# Patient Record
Sex: FEMALE | Race: WHITE | NOT HISPANIC OR LATINO | Employment: FULL TIME | URBAN - METROPOLITAN AREA
[De-identification: names, ages, dates, MRNs, and addresses within clinical notes are randomized per-mention and may not be internally consistent; named-entity substitution may affect disease eponyms.]

---

## 2023-03-09 ENCOUNTER — TELEPHONE (OUTPATIENT)
Dept: PRIMARY CARE | Facility: CLINIC | Age: 43
End: 2023-03-09
Payer: COMMERCIAL

## 2023-03-09 NOTE — TELEPHONE ENCOUNTER
She was seen at  on Sunday and dx with strep. Symptoms started Sat. She is on  mg bid for 10 days. She states she now has laryngitis, and her throat is very swollen. She wants to know if she should come in to be seen or if the Abx takes a while to work?

## 2023-03-10 NOTE — TELEPHONE ENCOUNTER
Spoke with pt. She is feeling worse but does not want to see anyone else but KT. She will call in the morning to be added as a same day sick appt. To see KT.

## 2023-03-11 ENCOUNTER — OFFICE VISIT (OUTPATIENT)
Dept: PRIMARY CARE | Facility: CLINIC | Age: 43
End: 2023-03-11
Payer: COMMERCIAL

## 2023-03-11 VITALS
BODY MASS INDEX: 28.32 KG/M2 | HEART RATE: 72 BPM | OXYGEN SATURATION: 98 % | SYSTOLIC BLOOD PRESSURE: 124 MMHG | WEIGHT: 191.2 LBS | HEIGHT: 69 IN | DIASTOLIC BLOOD PRESSURE: 76 MMHG

## 2023-03-11 DIAGNOSIS — B37.9 ANTIBIOTIC-INDUCED YEAST INFECTION: Primary | ICD-10-CM

## 2023-03-11 DIAGNOSIS — T36.95XA ANTIBIOTIC-INDUCED YEAST INFECTION: Primary | ICD-10-CM

## 2023-03-11 DIAGNOSIS — J40 BRONCHITIS: ICD-10-CM

## 2023-03-11 PROCEDURE — 1036F TOBACCO NON-USER: CPT | Performed by: NURSE PRACTITIONER

## 2023-03-11 PROCEDURE — 99213 OFFICE O/P EST LOW 20 MIN: CPT | Performed by: NURSE PRACTITIONER

## 2023-03-11 RX ORDER — BUPROPION HYDROCHLORIDE 300 MG/1
1 TABLET ORAL DAILY
COMMUNITY
Start: 2022-08-26 | End: 2023-05-15

## 2023-03-11 RX ORDER — ACETAMINOPHEN 325 MG/1
500 TABLET ORAL EVERY 6 HOURS
COMMUNITY
Start: 2020-07-28

## 2023-03-11 RX ORDER — IBUPROFEN 400 MG/1
200 TABLET ORAL EVERY 6 HOURS PRN
COMMUNITY
Start: 2016-12-05 | End: 2023-05-13 | Stop reason: SDUPTHER

## 2023-03-11 RX ORDER — SUMATRIPTAN SUCCINATE 25 MG/1
25 TABLET ORAL ONCE AS NEEDED
COMMUNITY
Start: 2023-02-14 | End: 2023-05-13 | Stop reason: SDUPTHER

## 2023-03-11 RX ORDER — LEVONORGESTREL 52 MG/1
1 INTRAUTERINE DEVICE INTRAUTERINE ONCE
COMMUNITY
Start: 2016-12-05 | End: 2023-05-13 | Stop reason: SDUPTHER

## 2023-03-11 RX ORDER — ALBUTEROL SULFATE 90 UG/1
2 AEROSOL, METERED RESPIRATORY (INHALATION) EVERY 4 HOURS PRN
COMMUNITY
Start: 2022-09-14

## 2023-03-11 RX ORDER — FLUCONAZOLE 150 MG/1
TABLET ORAL
Qty: 2 TABLET | Refills: 0 | Status: SHIPPED | OUTPATIENT
Start: 2023-03-11 | End: 2023-05-13 | Stop reason: SDUPTHER

## 2023-03-11 RX ORDER — EPINEPHRINE 0.3 MG/.3ML
0.3 INJECTION SUBCUTANEOUS AS NEEDED
COMMUNITY
Start: 2017-06-02 | End: 2023-03-22 | Stop reason: SDUPTHER

## 2023-03-11 RX ORDER — DOXYCYCLINE 100 MG/1
100 CAPSULE ORAL 2 TIMES DAILY
Qty: 20 CAPSULE | Refills: 0 | Status: SHIPPED | OUTPATIENT
Start: 2023-03-11 | End: 2023-03-21

## 2023-03-11 RX ORDER — OMEPRAZOLE 40 MG/1
40 CAPSULE, DELAYED RELEASE ORAL
COMMUNITY
End: 2023-05-13 | Stop reason: SDUPTHER

## 2023-03-11 RX ORDER — MINERAL OIL
1 ENEMA (ML) RECTAL DAILY
COMMUNITY

## 2023-03-11 RX ORDER — PENICILLIN V POTASSIUM 500 MG/1
1 TABLET, FILM COATED ORAL 2 TIMES DAILY
COMMUNITY
Start: 2023-03-06 | End: 2023-05-13 | Stop reason: SDUPTHER

## 2023-03-11 RX ORDER — AZELASTINE 1 MG/ML
2 SPRAY, METERED NASAL 2 TIMES DAILY
COMMUNITY
Start: 2020-07-07

## 2023-03-11 RX ORDER — BUTYROSPERMUM PARKII(SHEA BUTTER), SIMMONDSIA CHINENSIS (JOJOBA) SEED OIL, ALOE BARBADENSIS LEAF EXTRACT .01; 1; 3.5 G/100G; G/100G; G/100G
1 LIQUID TOPICAL DAILY
COMMUNITY
Start: 2016-12-05

## 2023-03-11 RX ORDER — FLAXSEED OIL 1000 MG
1 CAPSULE ORAL DAILY
COMMUNITY

## 2023-03-11 RX ORDER — HYDROXYCHLOROQUINE SULFATE 200 MG/1
1 TABLET, FILM COATED ORAL 2 TIMES DAILY
COMMUNITY
Start: 2022-09-14 | End: 2023-05-13 | Stop reason: SDUPTHER

## 2023-03-11 RX ORDER — FLUTICASONE PROPIONATE 50 MCG
2 SPRAY, SUSPENSION (ML) NASAL DAILY
COMMUNITY
Start: 2021-04-15 | End: 2023-05-13 | Stop reason: SDUPTHER

## 2023-03-11 ASSESSMENT — ENCOUNTER SYMPTOMS
COUGH: 1
GASTROINTESTINAL NEGATIVE: 1
CARDIOVASCULAR NEGATIVE: 1
SINUS PRESSURE: 1
WHEEZING: 1
SORE THROAT: 1

## 2023-03-11 NOTE — PROGRESS NOTES
"Subjective   Patient ID: Jose Wu is a 42 y.o. female who presents for Sore Throat, Cough, Joint Swelling, and chest congestion ([facility]).    Patient presents with continued cough congestion postnasal drainage.  [facility] [place] [facility] was treated with penicillin.  Patient does feel her sore throat has improved.  Patient states she is now having more chest congestion cough.  Denies any fevers or chills.  Patient also reports she does have a tendency to get yeast infections after antibiotic use especially multiple antibiotics    Sore Throat   Associated symptoms include congestion and coughing.   Cough  Associated symptoms include postnasal drip, a sore throat and wheezing.        Review of Systems   HENT:  Positive for congestion, postnasal drip, sinus pressure and sore throat.    Respiratory:  Positive for cough and wheezing.    Cardiovascular: Negative.    Gastrointestinal: Negative.    Genitourinary: Negative.    Skin: Negative.        Objective   /76   Pulse 72   Ht 1.74 m (5' 8.5\")   Wt 86.7 kg (191 lb 3.2 oz)   SpO2 98%   BMI 28.65 kg/m²     Physical Exam    Assessment/Plan   Problem List Items Addressed This Visit          Respiratory    Bronchitis     Patient continues to have cough congestion for over a week.  Patient was seen at urgent care for strep but states the symptoms started due to after.  She has been on the penicillin will complete as the sore throat has improved    Discussed this could be viral but we will send in doxycycline due to patient's history she should start if not improving after finishing the penicillin or if symptoms worsen    Patient to continue using her sinus/allergy medication         Relevant Medications    doxycycline (Vibramycin) 100 mg capsule       Infectious/Inflammatory    Antibiotic-induced yeast infection - Primary     Due to multiple antibiotic use we will send in Diflucan as patient has a " history of developing yeast infections         Relevant Medications    fluconazole (Diflucan) 150 mg tablet

## 2023-03-11 NOTE — ASSESSMENT & PLAN NOTE
Patient continues to have cough congestion for over a week.  Patient was seen at urgent care for strep but states the symptoms started due to after.  She has been on the penicillin will complete as the sore throat has improved    Discussed this could be viral but we will send in doxycycline due to patient's history she should start if not improving after finishing the penicillin or if symptoms worsen    Patient to continue using her sinus/allergy medication

## 2023-03-11 NOTE — PATIENT INSTRUCTIONS
As we discussed I sent in the doxycycline.  I would suggest you finish the penicillin if symptoms improve do not start the doxycycline.  I feel at this time you may have a viral bronchitis on top of the strep you had almost a week ago.  Please continue to stay hydrated and get rest.    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

## 2023-03-11 NOTE — ASSESSMENT & PLAN NOTE
Due to multiple antibiotic use we will send in Diflucan as patient has a history of developing yeast infections

## 2023-03-21 ENCOUNTER — TELEPHONE (OUTPATIENT)
Dept: PRIMARY CARE | Facility: CLINIC | Age: 43
End: 2023-03-21
Payer: COMMERCIAL

## 2023-03-21 DIAGNOSIS — Z88.1 DRUG ALLERGY, ANTIBIOTIC: ICD-10-CM

## 2023-03-21 NOTE — TELEPHONE ENCOUNTER
Rx Refill Request Telephone Encounter    Name:  Jose Wu  :  [id number]  Medication Name:   epipen    prn for seafood allergy      1 box of 2    Specific Pharm[place]  [facility] [place]  Date of last appointment:  11/15/2022  Date of next appointment:  [id number]  Best number to reach patient:  [phone]

## 2023-03-22 RX ORDER — EPINEPHRINE 0.3 MG/.3ML
0.3 INJECTION SUBCUTANEOUS AS NEEDED
Qty: 2 EACH | Refills: 2 | Status: SHIPPED | OUTPATIENT
Start: 2023-03-22 | End: 2023-03-24 | Stop reason: SDUPTHER

## 2023-03-25 DIAGNOSIS — J40 BRONCHITIS: ICD-10-CM

## 2023-03-25 DIAGNOSIS — Z88.1 DRUG ALLERGY, ANTIBIOTIC: ICD-10-CM

## 2023-03-25 RX ORDER — EPINEPHRINE 0.3 MG/.3ML
0.3 INJECTION SUBCUTANEOUS AS NEEDED
Qty: 2 EACH | Refills: 2 | Status: SHIPPED | OUTPATIENT
Start: 2023-03-25 | End: 2023-03-27

## 2023-05-12 PROBLEM — R07.89 CHEST PAIN, ATYPICAL: Status: ACTIVE | Noted: 2023-05-12

## 2023-05-12 PROBLEM — J02.8 ACUTE BACTERIAL PHARYNGITIS: Status: RESOLVED | Noted: 2023-05-12 | Resolved: 2023-05-12

## 2023-05-12 PROBLEM — K81.1 CHOLECYSTITIS, CHRONIC: Status: ACTIVE | Noted: 2023-05-12

## 2023-05-12 PROBLEM — U07.1 COVID-19: Status: RESOLVED | Noted: 2023-05-12 | Resolved: 2023-05-12

## 2023-05-12 PROBLEM — J02.9 SORE THROAT: Status: RESOLVED | Noted: 2023-05-12 | Resolved: 2023-05-12

## 2023-05-12 PROBLEM — R76.8 ANA POSITIVE: Status: ACTIVE | Noted: 2023-05-12

## 2023-05-12 PROBLEM — K21.9 GERD (GASTROESOPHAGEAL REFLUX DISEASE): Status: ACTIVE | Noted: 2023-05-12

## 2023-05-12 PROBLEM — N63.10 BREAST MASS, RIGHT: Status: ACTIVE | Noted: 2023-05-12

## 2023-05-12 PROBLEM — F32.A CHRONIC DEPRESSION: Status: ACTIVE | Noted: 2023-05-12

## 2023-05-12 PROBLEM — J01.01 ACUTE RECURRENT MAXILLARY SINUSITIS: Status: RESOLVED | Noted: 2023-05-12 | Resolved: 2023-05-12

## 2023-05-12 PROBLEM — M77.50 TENDONITIS OF FOOT: Status: RESOLVED | Noted: 2023-05-12 | Resolved: 2023-05-12

## 2023-05-12 PROBLEM — R10.13 INTERMITTENT EPIGASTRIC ABDOMINAL PAIN: Status: ACTIVE | Noted: 2023-05-12

## 2023-05-12 PROBLEM — M79.642 PAIN IN BOTH HANDS: Status: ACTIVE | Noted: 2023-05-12

## 2023-05-12 PROBLEM — R05.9 COUGH: Status: RESOLVED | Noted: 2023-05-12 | Resolved: 2023-05-12

## 2023-05-12 PROBLEM — B37.31 YEAST VAGINITIS: Status: RESOLVED | Noted: 2023-05-12 | Resolved: 2023-05-12

## 2023-05-12 PROBLEM — M79.641 PAIN IN BOTH HANDS: Status: ACTIVE | Noted: 2023-05-12

## 2023-05-12 PROBLEM — K81.0 CHOLECYSTITIS, ACUTE: Status: RESOLVED | Noted: 2023-05-12 | Resolved: 2023-05-12

## 2023-05-12 PROBLEM — J20.9 BRONCHITIS WITH BRONCHOSPASM: Status: RESOLVED | Noted: 2023-05-12 | Resolved: 2023-05-12

## 2023-05-12 PROBLEM — G43.009 MIGRAINE WITHOUT AURA AND WITHOUT STATUS MIGRAINOSUS, NOT INTRACTABLE: Status: ACTIVE | Noted: 2023-05-12

## 2023-05-12 PROBLEM — N39.0 UTI (URINARY TRACT INFECTION): Status: RESOLVED | Noted: 2023-05-12 | Resolved: 2023-05-12

## 2023-05-12 PROBLEM — J30.9 ALLERGIC RHINITIS: Status: ACTIVE | Noted: 2023-05-12

## 2023-05-12 PROBLEM — J01.40 ACUTE NON-RECURRENT PANSINUSITIS: Status: RESOLVED | Noted: 2023-05-12 | Resolved: 2023-05-12

## 2023-05-12 PROBLEM — H10.31 ACUTE BACTERIAL CONJUNCTIVITIS OF RIGHT EYE: Status: RESOLVED | Noted: 2023-05-12 | Resolved: 2023-05-12

## 2023-05-12 PROBLEM — M19.90 INFLAMMATORY ARTHRITIS: Status: ACTIVE | Noted: 2023-05-12

## 2023-05-12 PROBLEM — B96.89 ACUTE BACTERIAL PHARYNGITIS: Status: RESOLVED | Noted: 2023-05-12 | Resolved: 2023-05-12

## 2023-05-12 PROBLEM — R53.83 FATIGUE: Status: ACTIVE | Noted: 2023-05-12

## 2023-05-12 PROBLEM — R10.813 RIGHT LOWER QUADRANT ABDOMINAL TENDERNESS WITHOUT REBOUND TENDERNESS: Status: ACTIVE | Noted: 2023-05-12

## 2023-05-13 ENCOUNTER — LAB (OUTPATIENT)
Dept: LAB | Facility: LAB | Age: 43
End: 2023-05-13
Payer: COMMERCIAL

## 2023-05-13 ENCOUNTER — OFFICE VISIT (OUTPATIENT)
Dept: PRIMARY CARE | Facility: CLINIC | Age: 43
End: 2023-05-13
Payer: COMMERCIAL

## 2023-05-13 VITALS
BODY MASS INDEX: 28.14 KG/M2 | DIASTOLIC BLOOD PRESSURE: 84 MMHG | OXYGEN SATURATION: 96 % | HEART RATE: 90 BPM | TEMPERATURE: 98.1 F | SYSTOLIC BLOOD PRESSURE: 138 MMHG | WEIGHT: 190 LBS | HEIGHT: 69 IN

## 2023-05-13 DIAGNOSIS — R76.8 ANA POSITIVE: ICD-10-CM

## 2023-05-13 DIAGNOSIS — R53.83 FATIGUE, UNSPECIFIED TYPE: ICD-10-CM

## 2023-05-13 DIAGNOSIS — F32.A CHRONIC DEPRESSION: ICD-10-CM

## 2023-05-13 DIAGNOSIS — G43.009 MIGRAINE WITHOUT AURA AND WITHOUT STATUS MIGRAINOSUS, NOT INTRACTABLE: ICD-10-CM

## 2023-05-13 DIAGNOSIS — B99.9 RECURRENT INFECTIONS: ICD-10-CM

## 2023-05-13 DIAGNOSIS — M79.641 PAIN IN BOTH HANDS: ICD-10-CM

## 2023-05-13 DIAGNOSIS — J03.01 RECURRENT STREPTOCOCCAL TONSILLITIS: Primary | ICD-10-CM

## 2023-05-13 DIAGNOSIS — J03.01 RECURRENT STREPTOCOCCAL TONSILLITIS: ICD-10-CM

## 2023-05-13 DIAGNOSIS — Z88.9 DRUG ALLERGY: ICD-10-CM

## 2023-05-13 DIAGNOSIS — M79.642 PAIN IN BOTH HANDS: ICD-10-CM

## 2023-05-13 LAB
ERYTHROCYTE DISTRIBUTION WIDTH (RATIO) BY AUTOMATED COUNT: 13.1 % (ref 11.5–14.5)
ERYTHROCYTE MEAN CORPUSCULAR HEMOGLOBIN CONCENTRATION (G/DL) BY AUTOMATED: 30.9 G/DL (ref 32–36)
ERYTHROCYTE MEAN CORPUSCULAR VOLUME (FL) BY AUTOMATED COUNT: 87 FL (ref 80–100)
ERYTHROCYTES (10*6/UL) IN BLOOD BY AUTOMATED COUNT: 5.15 X10E12/L (ref 4–5.2)
HEMATOCRIT (%) IN BLOOD BY AUTOMATED COUNT: 44.7 % (ref 36–46)
HEMOGLOBIN (G/DL) IN BLOOD: 13.8 G/DL (ref 12–16)
LEUKOCYTES (10*3/UL) IN BLOOD BY AUTOMATED COUNT: 11.1 X10E9/L (ref 4.4–11.3)
PLATELETS (10*3/UL) IN BLOOD AUTOMATED COUNT: 307 X10E9/L (ref 150–450)
SEDIMENTATION RATE, ERYTHROCYTE: 30 MM/H (ref 0–20)
THYROTROPIN (MIU/L) IN SER/PLAS BY DETECTION LIMIT <= 0.05 MIU/L: 1.42 MIU/L (ref 0.44–3.98)

## 2023-05-13 PROCEDURE — 85652 RBC SED RATE AUTOMATED: CPT

## 2023-05-13 PROCEDURE — 82784 ASSAY IGA/IGD/IGG/IGM EACH: CPT

## 2023-05-13 PROCEDURE — 99214 OFFICE O/P EST MOD 30 MIN: CPT | Performed by: FAMILY MEDICINE

## 2023-05-13 PROCEDURE — 85027 COMPLETE CBC AUTOMATED: CPT

## 2023-05-13 PROCEDURE — 84443 ASSAY THYROID STIM HORMONE: CPT

## 2023-05-13 PROCEDURE — 1036F TOBACCO NON-USER: CPT | Performed by: FAMILY MEDICINE

## 2023-05-13 PROCEDURE — 36415 COLL VENOUS BLD VENIPUNCTURE: CPT

## 2023-05-13 RX ORDER — OMEPRAZOLE 40 MG/1
1 CAPSULE, DELAYED RELEASE ORAL DAILY
COMMUNITY
Start: 2022-11-21 | End: 2023-06-01

## 2023-05-13 RX ORDER — PENICILLIN V POTASSIUM 500 MG/1
500 TABLET, FILM COATED ORAL 2 TIMES DAILY
Qty: 20 TABLET | Refills: 0 | COMMUNITY
Start: 2023-05-11 | End: 2023-05-21

## 2023-05-13 RX ORDER — SUMATRIPTAN SUCCINATE 25 MG/1
25 TABLET ORAL ONCE AS NEEDED
Qty: 9 TABLET | Refills: 3 | Status: SHIPPED | OUTPATIENT
Start: 2023-05-13 | End: 2023-09-19

## 2023-05-13 RX ORDER — FLUTICASONE PROPIONATE 50 MCG
1 SPRAY, SUSPENSION (ML) NASAL
COMMUNITY

## 2023-05-13 RX ORDER — AMOXICILLIN AND CLAVULANATE POTASSIUM 875; 125 MG/1; MG/1
875 TABLET, FILM COATED ORAL 2 TIMES DAILY
Qty: 28 TABLET | Refills: 0 | Status: SHIPPED | OUTPATIENT
Start: 2023-05-13 | End: 2023-05-27

## 2023-05-13 RX ORDER — LEVONORGESTREL 52 MG/1
52 INTRAUTERINE DEVICE INTRAUTERINE
COMMUNITY
Start: 2016-12-05

## 2023-05-13 RX ORDER — IBUPROFEN 600 MG/1
600 TABLET ORAL EVERY 6 HOURS PRN
COMMUNITY
Start: 2012-11-13

## 2023-05-13 RX ORDER — FLUCONAZOLE 150 MG/1
150 TABLET ORAL ONCE
Qty: 1 TABLET | Refills: 1 | Status: SHIPPED | OUTPATIENT
Start: 2023-05-13 | End: 2023-05-13

## 2023-05-13 RX ORDER — HYDROXYCHLOROQUINE SULFATE 200 MG/1
200 TABLET, FILM COATED ORAL
COMMUNITY
End: 2024-01-03

## 2023-05-13 ASSESSMENT — ENCOUNTER SYMPTOMS
FEVER: 1
COUGH: 1

## 2023-05-13 NOTE — PATIENT INSTRUCTIONS
Patient does have a family history of immune deficiency as well as multiple allergies, think it would be helpful to have her see the immunologist and allergist again to reassess her medication allergies and see if she has an immune deficiency    You may take over-the-counter medications as needed for symptom relief.  Call the office if your symptoms worsen such as high fever and worsening cough or increase in symptoms.     Get your blood work as ordered.  You should hear from our office with results whether they are normal are not within a few days.  Please call the office if you do not hear from us.     Diflucan as needed    Refills given on migraine medication    Recommend staying off Plaquenil for a week to see if that helps

## 2023-05-13 NOTE — PROGRESS NOTES
"Subjective   Patient ID: Jose Wu is a 43 y.o. female who presents for URI (Pt has had strep 3x since march, causes laryngitis, Pt seen at urgent care Thursday.  ).    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   Respiratory:  Positive for cough.        Objective   /84   Pulse 90   Temp 36.7 °C (98.1 °F)   Ht 1.74 m (5' 8.5\")   Wt 86.2 kg (190 lb)   SpO2 96%   BMI 28.47 kg/m²     Physical Exam  Constitutional:       Appearance: Normal appearance.   HENT:      Head: Normocephalic and atraumatic.      Right Ear: Tympanic membrane and ear canal normal.      Left Ear: Tympanic membrane and ear canal normal.      Nose: No nasal deformity.      Right Sinus: No maxillary sinus tenderness or frontal sinus tenderness.      Left Sinus: No maxillary sinus tenderness or frontal sinus tenderness.      Mouth/Throat:      Mouth: Mucous membranes are moist. No oral lesions.      Tongue: No lesions.      Pharynx: Oropharynx is clear.      Comments: Small tonsillar tissue with erythema bilaterally  Cardiovascular:      Rate and Rhythm: Normal rate and regular rhythm.   Pulmonary:      Effort: Pulmonary effort is normal.      Breath sounds: Normal breath sounds.   Musculoskeletal:      Cervical back: No tenderness.   Lymphadenopathy:      Cervical: No cervical adenopathy.   Neurological:      Mental Status: She is alert.     No axillary, inguinal, supraclavicular or cervical adenopathy    Assessment/Plan   Problem List Items Addressed This Visit    None         "

## 2023-05-14 LAB
IGA (MG/DL) IN SER/PLAS: 112 MG/DL (ref 70–400)
IGG (MG/DL) IN SER/PLAS: 988 MG/DL (ref 700–1600)
IGM (MG/DL) IN SER/PLAS: 59 MG/DL (ref 40–230)

## 2023-05-15 RX ORDER — HYDROXYCHLOROQUINE SULFATE 200 MG/1
TABLET, FILM COATED ORAL
Qty: 180 TABLET | Refills: 0 | OUTPATIENT
Start: 2023-05-15

## 2023-05-15 RX ORDER — BUPROPION HYDROCHLORIDE 300 MG/1
300 TABLET ORAL DAILY
Qty: 90 TABLET | Refills: 2 | Status: SHIPPED | OUTPATIENT
Start: 2023-05-15 | End: 2024-02-19 | Stop reason: SDUPTHER

## 2023-05-15 NOTE — RESULT ENCOUNTER NOTE
Please notify pt of lab results Labs show elevated sedimentation rate which is an inflammatory marker likely related to her arthritis, CBC is normal, immunoglobulins normal, thyroid normal, recommend see immunologist as discussed

## 2023-05-16 ENCOUNTER — APPOINTMENT (OUTPATIENT)
Dept: PRIMARY CARE | Facility: CLINIC | Age: 43
End: 2023-05-16
Payer: COMMERCIAL

## 2023-05-31 DIAGNOSIS — K21.9 GASTROESOPHAGEAL REFLUX DISEASE, UNSPECIFIED WHETHER ESOPHAGITIS PRESENT: ICD-10-CM

## 2023-06-01 RX ORDER — OMEPRAZOLE 40 MG/1
CAPSULE, DELAYED RELEASE ORAL
Qty: 90 CAPSULE | Refills: 0 | Status: SHIPPED | OUTPATIENT
Start: 2023-06-01 | End: 2023-09-11

## 2023-06-21 ENCOUNTER — APPOINTMENT (OUTPATIENT)
Dept: LAB | Facility: LAB | Age: 43
End: 2023-06-21
Payer: COMMERCIAL

## 2023-06-21 LAB
ALLERGEN FOOD: PECAN NUT (CARYA ILLINOENSIS) IGE (KU/L): NORMAL
ALLERGEN MICROORGANISM: PENICILLIUM CHRYSOGENUM (P. NOTATUM) IGE (KU/L): NORMAL
ALLERGEN TREE: MAPLE LEAF SYCAMORE, LONDON PLANE IGE (KU/L): NORMAL
BASOPHILS (10*3/UL) IN BLOOD BY AUTOMATED COUNT: 0.04 X10E9/L (ref 0–0.1)
BASOPHILS/100 LEUKOCYTES IN BLOOD BY AUTOMATED COUNT: 0.6 % (ref 0–2)
C REACTIVE PROTEIN (MG/L) IN SER/PLAS: 0.16 MG/DL
EOSINOPHILS (10*3/UL) IN BLOOD BY AUTOMATED COUNT: 0.15 X10E9/L (ref 0–0.7)
EOSINOPHILS/100 LEUKOCYTES IN BLOOD BY AUTOMATED COUNT: 2.3 % (ref 0–6)
ERYTHROCYTE DISTRIBUTION WIDTH (RATIO) BY AUTOMATED COUNT: 13 % (ref 11.5–14.5)
ERYTHROCYTE MEAN CORPUSCULAR HEMOGLOBIN CONCENTRATION (G/DL) BY AUTOMATED: 31.7 G/DL (ref 32–36)
ERYTHROCYTE MEAN CORPUSCULAR VOLUME (FL) BY AUTOMATED COUNT: 85 FL (ref 80–100)
ERYTHROCYTES (10*6/UL) IN BLOOD BY AUTOMATED COUNT: 4.8 X10E12/L (ref 4–5.2)
HEMATOCRIT (%) IN BLOOD BY AUTOMATED COUNT: 40.7 % (ref 36–46)
HEMOGLOBIN (G/DL) IN BLOOD: 12.9 G/DL (ref 12–16)
IMMATURE GRANULOCYTES/100 LEUKOCYTES IN BLOOD BY AUTOMATED COUNT: 0.6 % (ref 0–0.9)
LEUKOCYTES (10*3/UL) IN BLOOD BY AUTOMATED COUNT: 6.6 X10E9/L (ref 4.4–11.3)
LYMPHOCYTES (10*3/UL) IN BLOOD BY AUTOMATED COUNT: 1.11 X10E9/L (ref 1.2–4.8)
LYMPHOCYTES/100 LEUKOCYTES IN BLOOD BY AUTOMATED COUNT: 16.9 % (ref 13–44)
MONOCYTES (10*3/UL) IN BLOOD BY AUTOMATED COUNT: 0.52 X10E9/L (ref 0.1–1)
MONOCYTES/100 LEUKOCYTES IN BLOOD BY AUTOMATED COUNT: 7.9 % (ref 2–10)
NEUTROPHILS (10*3/UL) IN BLOOD BY AUTOMATED COUNT: 4.69 X10E9/L (ref 1.2–7.7)
NEUTROPHILS/100 LEUKOCYTES IN BLOOD BY AUTOMATED COUNT: 71.7 % (ref 40–80)
PLATELETS (10*3/UL) IN BLOOD AUTOMATED COUNT: 298 X10E9/L (ref 150–450)
SEDIMENTATION RATE, ERYTHROCYTE: 6 MM/H (ref 0–20)

## 2023-06-22 LAB
ALLERGEN ANIMAL: CAT DANDER IGE (KU/L): <0.1 KU/L
ALLERGEN ANIMAL: DOG DANDER IGE (KU/L): <0.1 KU/L
ALLERGEN FOOD: CLAM (RUDITAPES SPP.) IGE (KU/L): <0.1 KU/L
ALLERGEN FOOD: CRAB (CHIONOECETES SPP.)IGE (KU/L): <0.1 KU/L
ALLERGEN FOOD: FISH (COD) GADUS MORHUA) IGE (KU/L): <0.1 KU/L
ALLERGEN FOOD: LOBSTER (HOMARUS GAMMARUS) IGE (KU/L): <0.1 KU/L
ALLERGEN FOOD: SALMON (SALMO SALAR) IGE (KU/L): <0.1 KU/L
ALLERGEN FOOD: SCALLOP (PECTEN SPP.) IGE (KU/L): <0.1 KU/L
ALLERGEN FOOD: SHRIMP (P. BOREALIS/MONODON, M. BARBATA/JOYNERI) IGE (KU/L): <0.1 KU/L
ALLERGEN FOOD: TUNA (THUNNUS ALBACARES) IGE (KU/L): <0.1 KU/L
ALLERGEN GRASS: BERMUDA GRASS (CYNODON DACTYLON) IGE (KU/L): <0.1 KU/L
ALLERGEN GRASS: MEADOW FESCUE (FESTUCA ELATIOR) IGE (KU/L): <0.1 KU/L
ALLERGEN GRASS: MEADOW GRASS, KENTUCKY BLUE (POA PRATENSIS )IGE (KU/L): <0.1 KU/L
ALLERGEN GRASS: TIMOTHY GRASS (PHLEUM PRATENSE) IGE (KU/L): <0.1 KU/L
ALLERGEN MICROORGANISM: ALTERNARIA ALTERNATA IGE (KU/L): <0.1 KU/L
ALLERGEN MICROORGANISM: ASPERGILLUS FUMIGATUS IGE (KU/L): <0.1 KU/L
ALLERGEN MICROORGANISM: CLADOSPORIUM HERBARUM IGE (KU/L): <0.1 KU/L
ALLERGEN MICROORGANISM: PENICILLIUM CHRYSOGENUM (P. NOTATUM) IGE (KU/L): <0.1 KU/L
ALLERGEN MITE: DERMATOPHAGOIDES FARINAE (HOUSE DUST MITE) IGE (KU/L): <0.1 KU/L
ALLERGEN MITE: DERMATOPHAGOIDES PTERONYSSINUS (HOUSE DUST MITE) IGE (KU/L): <0.1 KU/L
ALLERGEN TREE: COMMON SILVER BIRCH (BETULA VERRUCOSA) IGE (KU/L): <0.1 KU/L
ALLERGEN TREE: COTTONWOOD (POPULUS DELTOIDES) IGE (KU/L): <0.1 KU/L
ALLERGEN TREE: ELM (ULMUS AMERICANA) IGE (KU/L): <0.1 KU/L
ALLERGEN TREE: OAK (QUERCUS ALBA) IGE (KU/L): <0.1 KU/L
ALLERGEN WEED: COCKLEBUR (XANTHIUM COMMUNE) IGE (KU/L): <0.1 KU/L
ALLERGEN WEED: COMMON RAGWEED (AMB. ARTEMISIIFOLIA/A. ELATIOR) IGE (KU/L): <0.1 KU/L
ALLERGEN WEED: GIANT RAGWEED (AMBROSIA TRIFIDA) IGE (KU/L): <0.1 KU/L
ALLERGEN WEED: GOOSEFOOT, LAMB'S QUARTERS (CHENOPODIUM ALBUM) IGE (KU/L): <0.1 KU/L
ALLERGEN WEED: PLANTAIN (ENGLISH), RIBWORT (PLANTAGO LANCEOLATA) IGE (KU/L): <0.1 KU/L
ALLERGEN WEED: SHEEP SORREL (RUMEX ACETOSELLA) IGE (KU/L): <0.1 KU/L
IGA (MG/DL) IN SER/PLAS: 122 MG/DL (ref 70–400)
IGG (MG/DL) IN SER/PLAS: 1090 MG/DL (ref 700–1600)
IGM (MG/DL) IN SER/PLAS: 59 MG/DL (ref 40–230)
IMMUNOCAP INTERPRETATION: NORMAL
IMMUNOCAP INTERPRETATION: NORMAL

## 2023-06-26 LAB
ALLERGEN FOOD: BLUE MUSSEL (MYTILUS EDULIS) IGE (KU/L): <0.1 KU/L
ALLERGEN HOUSE DUST: HOLLISTER-STIER LABS. IGE (KU/L): <0.1 KU/L
ALLERGEN TREE: AMERICAN BEECH (FAGUS GRANDIFOLIA) IGE (KU/L): <0.1 KU/L
ALLERGEN WEED: GOLDENROD (SOLIDAGO VIRGAUREA) IGE (KU/L): <0.1 KU/L
IMMUNOCAP INTERPRETATION (ARUP): NORMAL
Lab: <0.1 KU/L
PNEUMO SEROTYPE INTERPRETATION: NORMAL
SEROTYPE 1, VIRC: 1.55 UG/ML
SEROTYPE 12F, VIRC: 0.4 UG/ML
SEROTYPE 14, VIRC: 14.81 UG/ML
SEROTYPE 18C(56), VIRC: 4.72 UG/ML
SEROTYPE 19F, VIRC: 2.86 UG/ML
SEROTYPE 23F, VIRC: 0.76 UG/ML
SEROTYPE 3, VIRC: 0.38 UG/ML
SEROTYPE 4, VIRC: 0.3 UG/ML
SEROTYPE 5, VIRC: 3.6 UG/ML
SEROTYPE 6B(26), VIRC: 0.3 UG/ML
SEROTYPE 7F(51), VIRC: 13.65 UG/ML
SEROTYPE 8, VIRC: 0.14 UG/ML
SEROTYPE 9N, VIRC: 0.38 UG/ML
SEROTYPE 9V(68), VIRC: 0.22 UG/ML
T032 WILLOW, BLACK: <0.1 KU/L
TETANUS AB IGG: 2 IU/ML

## 2023-06-27 LAB
CLASS RED MAPLE, VIRC: 0
RED MAPLE IGE, VIRC: <0.35 KU/L

## 2023-06-28 LAB — MANNAN BINDING LECTIN: 4900 NG/ML

## 2023-06-30 LAB — F065 PERCH IGE: <0.35 KU/L

## 2023-07-03 LAB
MISCELLANEUOUS TEST RESULT: NORMAL
NAME OF SENDOUT TEST: NORMAL

## 2023-08-01 LAB — URINE CULTURE: NO GROWTH

## 2023-09-06 LAB
PNEUMO SEROTYPE INTERPRETATION: NORMAL
SEROTYPE 1, VIRC: 6.36 UG/ML
SEROTYPE 12F, VIRC: 0.56 UG/ML
SEROTYPE 14, VIRC: >35.84 UG/ML
SEROTYPE 18C(56), VIRC: 3.87 UG/ML
SEROTYPE 19F, VIRC: 17.27 UG/ML
SEROTYPE 23F, VIRC: 3.73 UG/ML
SEROTYPE 3, VIRC: 2.59 UG/ML
SEROTYPE 4, VIRC: 4.89 UG/ML
SEROTYPE 5, VIRC: 14.05 UG/ML
SEROTYPE 6B(26), VIRC: 1.63 UG/ML
SEROTYPE 7F(51), VIRC: 2.8 UG/ML
SEROTYPE 8, VIRC: 1.39 UG/ML
SEROTYPE 9N, VIRC: 1.3 UG/ML
SEROTYPE 9V(68), VIRC: 2.6 UG/ML

## 2023-09-09 DIAGNOSIS — K21.9 GASTROESOPHAGEAL REFLUX DISEASE, UNSPECIFIED WHETHER ESOPHAGITIS PRESENT: ICD-10-CM

## 2023-09-11 RX ORDER — OMEPRAZOLE 40 MG/1
CAPSULE, DELAYED RELEASE ORAL
Qty: 90 CAPSULE | Refills: 0 | Status: SHIPPED | OUTPATIENT
Start: 2023-09-11 | End: 2023-09-19 | Stop reason: SDUPTHER

## 2023-09-15 PROBLEM — M19.90 OSTEOARTHRITIS: Status: ACTIVE | Noted: 2023-09-15

## 2023-09-15 PROBLEM — R05.8 POST-VIRAL COUGH SYNDROME: Status: ACTIVE | Noted: 2023-09-15

## 2023-09-15 PROBLEM — R39.9 UTI SYMPTOMS: Status: RESOLVED | Noted: 2023-09-15 | Resolved: 2023-09-15

## 2023-09-15 RX ORDER — MELOXICAM 15 MG/1
15 TABLET ORAL DAILY
COMMUNITY
Start: 2023-08-31

## 2023-09-15 RX ORDER — CETIRIZINE HYDROCHLORIDE 10 MG/1
10 TABLET ORAL DAILY
COMMUNITY
Start: 2012-11-13

## 2023-09-19 ENCOUNTER — OFFICE VISIT (OUTPATIENT)
Dept: PRIMARY CARE | Facility: CLINIC | Age: 43
End: 2023-09-19
Payer: COMMERCIAL

## 2023-09-19 VITALS
HEIGHT: 68 IN | WEIGHT: 200 LBS | HEART RATE: 68 BPM | DIASTOLIC BLOOD PRESSURE: 83 MMHG | SYSTOLIC BLOOD PRESSURE: 137 MMHG | TEMPERATURE: 98.4 F | BODY MASS INDEX: 30.31 KG/M2 | OXYGEN SATURATION: 97 %

## 2023-09-19 DIAGNOSIS — R76.8 ANA POSITIVE: ICD-10-CM

## 2023-09-19 DIAGNOSIS — J03.01 RECURRENT STREPTOCOCCAL TONSILLITIS: ICD-10-CM

## 2023-09-19 DIAGNOSIS — Z00.00 ROUTINE GENERAL MEDICAL EXAMINATION AT A HEALTH CARE FACILITY: Primary | ICD-10-CM

## 2023-09-19 DIAGNOSIS — G43.009 MIGRAINE WITHOUT AURA AND WITHOUT STATUS MIGRAINOSUS, NOT INTRACTABLE: ICD-10-CM

## 2023-09-19 DIAGNOSIS — K21.9 GASTROESOPHAGEAL REFLUX DISEASE WITHOUT ESOPHAGITIS: ICD-10-CM

## 2023-09-19 DIAGNOSIS — B99.9 RECURRENT INFECTIONS: ICD-10-CM

## 2023-09-19 DIAGNOSIS — K21.9 GASTROESOPHAGEAL REFLUX DISEASE, UNSPECIFIED WHETHER ESOPHAGITIS PRESENT: ICD-10-CM

## 2023-09-19 DIAGNOSIS — F32.A CHRONIC DEPRESSION: ICD-10-CM

## 2023-09-19 DIAGNOSIS — J30.9 ALLERGIC RHINITIS, UNSPECIFIED SEASONALITY, UNSPECIFIED TRIGGER: ICD-10-CM

## 2023-09-19 DIAGNOSIS — R53.83 FATIGUE, UNSPECIFIED TYPE: ICD-10-CM

## 2023-09-19 DIAGNOSIS — Z12.31 ENCOUNTER FOR SCREENING MAMMOGRAM FOR MALIGNANT NEOPLASM OF BREAST: ICD-10-CM

## 2023-09-19 PROCEDURE — 99396 PREV VISIT EST AGE 40-64: CPT | Performed by: FAMILY MEDICINE

## 2023-09-19 PROCEDURE — 1036F TOBACCO NON-USER: CPT | Performed by: FAMILY MEDICINE

## 2023-09-19 RX ORDER — DOXYCYCLINE 100 MG/1
100 CAPSULE ORAL 2 TIMES DAILY
COMMUNITY
Start: 2023-09-15 | End: 2024-01-02 | Stop reason: ALTCHOICE

## 2023-09-19 RX ORDER — SUMATRIPTAN 50 MG/1
50 TABLET, FILM COATED ORAL ONCE AS NEEDED
Qty: 27 TABLET | Refills: 3 | Status: SHIPPED | OUTPATIENT
Start: 2023-09-19 | End: 2024-05-13

## 2023-09-19 RX ORDER — OMEPRAZOLE 40 MG/1
40 CAPSULE, DELAYED RELEASE ORAL DAILY
Qty: 90 CAPSULE | Refills: 3 | Status: SHIPPED | OUTPATIENT
Start: 2023-09-19 | End: 2024-04-29

## 2023-09-19 NOTE — PROGRESS NOTES
Subjective   Patient ID: Jose Wu is a 43 y.o. female who presents for Annual Exam. States she saw Dr. Bone regarding her allergies; scheduled for a sulf drug challenge. Pt had labs done and a Pneumovax 23 vaccine. Not fasting for labs. Currenlty taking Doxycycline for asthmatic bronchitis and sinus infection diagnosed by Dr. Bone.    Chronic allergies: Seeing allergist  On antihistamines  more coughing lately   Getting sulfa challenge   Was given inhaler but not covered ,  needs to call his office : Fluticasone       Major depressive disorder, on Wellbutrin, stable  Doing ok , dose is ok      Migraines: Taking sumatriptan as needed  Worse lately , inconsistent without trigger except sinuses     GERD   :  mostly ok        Some exercise   No CHEST PAIN     Some SHORTNESS OF BREATH with exercise since over the summer and RSV       Has IUD ,  sees gyn    No periods       Some diarrhea s/p gallbladder removal        ROS :  ( No or Yes )  Any eye problems:    N  Frequent nasal congestion or sneezing:  N  Difficulty hearing:  N  Ear problems:   N  Asthma or wheezing:   y  Frequent cough:   y  Shortness of breath:y  Hemoptysis: N  Hx of TB: N  High blood pressure: N  Heart disease: N  Heart murmur:N  Chest pain or pressure with exertion:y  Leg pains with walking up hill: N  Fast heartbeat or palpitations:N  Varicose veins: N  Difficulty swallowing foods or liquids: N  Abdominal pains: N  Frequent indigestion or heartburn: y  Constipation: N  Diarrhea or loose stools: N  Weight changes recently: N  Change in bowel movements: N  An ulcer: N  Black stools: N  Jaundice, hepatitis or liver problems: N  Gallstones or gallbladder problems: y  Stomach or intestinal problems: N  Vomited blood : N  Blood in bowel movements: N  Sickle cell trait  or Anemia: y  Been refused as a blood donor: N  Problems with her kidney, bladder, or prostate: N  Loss of control of your urine: N  Pain or burning with urination: N  Blood  "in her urine: N  Trouble starting flow of urine: N  Frequent urination at night: N  History of venereal disease: N  Any skin problems: N  Diabetes: N  Thyroid disease: N  Frequent back pain: y  Pain or swelling around joints: y  Broken any bones: N  Frequent headaches: y  Dizziness: N  Have you ever had Seizures or convulsions: N  Have you ever temporarily lost control of your hand or foot : N   Had a stroke or been paralyzed : N  Temporarily lost your ability to speak: N  Fainted or lost consciousness: N  Hallucinations: N  Nervousness: y  Do you take medications for your nerves: y  Trouble falling asleep or staying asleep: N  Do you feel tired even after a good night sleep: y  Do you feel down in the dumps or depressed: y  Frequent crying: N  Using alcohol excessively: N  Any street drug use : N  Do have any other medical problems that are concerns : low immune system    Review of Systems    Objective   /83   Pulse 68   Temp 36.9 °C (98.4 °F)   Ht 1.721 m (5' 7.75\")   Wt 90.7 kg (200 lb)   SpO2 97%   BMI 30.63 kg/m²     Physical Exam  Constitutional:       General: She is not in acute distress.     Appearance: Normal appearance.   HENT:      Head: Normocephalic and atraumatic.      Right Ear: Tympanic membrane and ear canal normal.      Left Ear: Tympanic membrane and ear canal normal.      Mouth/Throat:      Mouth: Mucous membranes are moist.   Eyes:      Conjunctiva/sclera: Conjunctivae normal.      Pupils: Pupils are equal, round, and reactive to light.   Neck:      Vascular: No carotid bruit.   Cardiovascular:      Rate and Rhythm: Normal rate and regular rhythm.      Heart sounds: No murmur heard.  Pulmonary:      Effort: Pulmonary effort is normal.      Breath sounds: Normal breath sounds. No wheezing or rhonchi.   Abdominal:      General: Bowel sounds are normal.      Palpations: Abdomen is soft.   Musculoskeletal:         General: No swelling.      Cervical back: No rigidity.   Lymphadenopathy: "      Cervical: No cervical adenopathy.   Skin:     General: Skin is warm and dry.      Findings: No rash.   Neurological:      Mental Status: She is alert.         Assessment/Plan   Problem List Items Addressed This Visit       Allergic rhinitis    Chronic depression    GERD (gastroesophageal reflux disease)    Migraine without aura and without status migrainosus, not intractable     Other Visit Diagnoses       Routine general medical examination at a health care facility    -  [facility]

## 2023-09-19 NOTE — PATIENT INSTRUCTIONS
Get your blood work as ordered.  You should hear from our office with results whether they are normal are not within a few days.  Please call the office if you do not hear from us.     You should be getting cardiovascular exercise 3-5 times per week for 30-45 minutes.  This includes exercises such as running, brisk walking, biking or swimming.      GERD  Take prescribed medication as written.  May take TUMS or Rolaids as needed.  No eating within 2 hours of going to bed.  May want to elevate the head of your bed.  Avoid caffeine, chocolate, alcohol, spicy foods, acidic foods, fried foods, mint flavoring.  Call or return to the office if your symptoms change,  worsen, or fail to improve.  It may take 2 weeks for the medication to take effect.      Elevated legs   Low salt diet   Compression stockings     Arch supports       Can try tumeric

## 2023-09-25 ENCOUNTER — LAB (OUTPATIENT)
Dept: LAB | Facility: LAB | Age: 43
End: 2023-09-25
Payer: COMMERCIAL

## 2023-09-25 DIAGNOSIS — K21.9 GASTROESOPHAGEAL REFLUX DISEASE, UNSPECIFIED WHETHER ESOPHAGITIS PRESENT: ICD-10-CM

## 2023-09-25 DIAGNOSIS — F32.A CHRONIC DEPRESSION: ICD-10-CM

## 2023-09-25 DIAGNOSIS — J30.9 ALLERGIC RHINITIS, UNSPECIFIED SEASONALITY, UNSPECIFIED TRIGGER: ICD-10-CM

## 2023-09-25 DIAGNOSIS — G43.009 MIGRAINE WITHOUT AURA AND WITHOUT STATUS MIGRAINOSUS, NOT INTRACTABLE: ICD-10-CM

## 2023-09-25 DIAGNOSIS — Z00.00 ROUTINE GENERAL MEDICAL EXAMINATION AT A HEALTH CARE FACILITY: ICD-10-CM

## 2023-09-25 DIAGNOSIS — J03.01 RECURRENT STREPTOCOCCAL TONSILLITIS: ICD-10-CM

## 2023-09-25 DIAGNOSIS — K21.9 GASTROESOPHAGEAL REFLUX DISEASE WITHOUT ESOPHAGITIS: ICD-10-CM

## 2023-09-25 DIAGNOSIS — R76.8 ANA POSITIVE: ICD-10-CM

## 2023-09-25 DIAGNOSIS — B99.9 RECURRENT INFECTIONS: ICD-10-CM

## 2023-09-25 DIAGNOSIS — R53.83 FATIGUE, UNSPECIFIED TYPE: ICD-10-CM

## 2023-09-25 LAB
ALANINE AMINOTRANSFERASE (SGPT) (U/L) IN SER/PLAS: 17 U/L (ref 7–45)
ALBUMIN (G/DL) IN SER/PLAS: 4.3 G/DL (ref 3.4–5)
ALKALINE PHOSPHATASE (U/L) IN SER/PLAS: 56 U/L (ref 33–110)
ANION GAP IN SER/PLAS: 13 MMOL/L (ref 10–20)
ASPARTATE AMINOTRANSFERASE (SGOT) (U/L) IN SER/PLAS: 16 U/L (ref 9–39)
BILIRUBIN TOTAL (MG/DL) IN SER/PLAS: 0.6 MG/DL (ref 0–1.2)
CALCIUM (MG/DL) IN SER/PLAS: 8.9 MG/DL (ref 8.6–10.3)
CARBON DIOXIDE, TOTAL (MMOL/L) IN SER/PLAS: 25 MMOL/L (ref 21–32)
CHLORIDE (MMOL/L) IN SER/PLAS: 103 MMOL/L (ref 98–107)
CHOLESTEROL (MG/DL) IN SER/PLAS: 168 MG/DL (ref 0–199)
CHOLESTEROL IN HDL (MG/DL) IN SER/PLAS: 63.7 MG/DL
CHOLESTEROL/HDL RATIO: 2.6
CREATININE (MG/DL) IN SER/PLAS: 0.81 MG/DL (ref 0.5–1.05)
ERYTHROCYTE DISTRIBUTION WIDTH (RATIO) BY AUTOMATED COUNT: 12.3 % (ref 11.5–14.5)
ERYTHROCYTE MEAN CORPUSCULAR HEMOGLOBIN CONCENTRATION (G/DL) BY AUTOMATED: 31.3 G/DL (ref 32–36)
ERYTHROCYTE MEAN CORPUSCULAR VOLUME (FL) BY AUTOMATED COUNT: 85 FL (ref 80–100)
ERYTHROCYTES (10*6/UL) IN BLOOD BY AUTOMATED COUNT: 4.83 X10E12/L (ref 4–5.2)
GFR FEMALE: >90 ML/MIN/1.73M2
GLUCOSE (MG/DL) IN SER/PLAS: 88 MG/DL (ref 74–99)
HEMATOCRIT (%) IN BLOOD BY AUTOMATED COUNT: 41.2 % (ref 36–46)
HEMOGLOBIN (G/DL) IN BLOOD: 12.9 G/DL (ref 12–16)
LDL: 80 MG/DL (ref 0–99)
LEUKOCYTES (10*3/UL) IN BLOOD BY AUTOMATED COUNT: 6.3 X10E9/L (ref 4.4–11.3)
PLATELETS (10*3/UL) IN BLOOD AUTOMATED COUNT: 327 X10E9/L (ref 150–450)
POTASSIUM (MMOL/L) IN SER/PLAS: 4.2 MMOL/L (ref 3.5–5.3)
PROTEIN TOTAL: 6.9 G/DL (ref 6.4–8.2)
SODIUM (MMOL/L) IN SER/PLAS: 137 MMOL/L (ref 136–145)
THYROXINE (T4) FREE (NG/DL) IN SER/PLAS: 0.69 NG/DL (ref 0.61–1.12)
TRIGLYCERIDE (MG/DL) IN SER/PLAS: 120 MG/DL (ref 0–149)
UREA NITROGEN (MG/DL) IN SER/PLAS: 9 MG/DL (ref 6–23)
VLDL: 24 MG/DL (ref 0–40)

## 2023-09-25 PROCEDURE — 80061 LIPID PANEL: CPT

## 2023-09-25 PROCEDURE — 84439 ASSAY OF FREE THYROXINE: CPT

## 2023-09-25 PROCEDURE — 80053 COMPREHEN METABOLIC PANEL: CPT

## 2023-09-25 PROCEDURE — 36415 COLL VENOUS BLD VENIPUNCTURE: CPT

## 2023-09-25 PROCEDURE — 85027 COMPLETE CBC AUTOMATED: CPT

## 2023-10-11 ENCOUNTER — HOSPITAL ENCOUNTER (OUTPATIENT)
Dept: RADIOLOGY | Facility: HOSPITAL | Age: 43
Discharge: HOME | End: 2023-10-11
Payer: COMMERCIAL

## 2023-10-11 ENCOUNTER — PATIENT MESSAGE (OUTPATIENT)
Dept: PRIMARY CARE | Facility: CLINIC | Age: 43
End: 2023-10-11
Payer: COMMERCIAL

## 2023-10-11 DIAGNOSIS — Z12.31 ENCOUNTER FOR SCREENING MAMMOGRAM FOR MALIGNANT NEOPLASM OF BREAST: ICD-10-CM

## 2023-10-11 PROCEDURE — 77067 SCR MAMMO BI INCL CAD: CPT | Mod: 50

## 2023-10-11 PROCEDURE — 77063 BREAST TOMOSYNTHESIS BI: CPT | Mod: 50

## 2023-10-11 PROCEDURE — 77063 BREAST TOMOSYNTHESIS BI: CPT | Mod: BILATERAL PROCEDURE | Performed by: STUDENT IN AN ORGANIZED HEALTH CARE EDUCATION/TRAINING PROGRAM

## 2023-10-11 PROCEDURE — 77067 SCR MAMMO BI INCL CAD: CPT | Mod: BILATERAL PROCEDURE | Performed by: STUDENT IN AN ORGANIZED HEALTH CARE EDUCATION/TRAINING PROGRAM

## 2023-10-12 DIAGNOSIS — R92.8 ABNORMAL MAMMOGRAM: Primary | ICD-10-CM

## 2023-10-16 ENCOUNTER — ANCILLARY PROCEDURE (OUTPATIENT)
Dept: RADIOLOGY | Facility: HOSPITAL | Age: 43
End: 2023-10-16
Payer: COMMERCIAL

## 2023-10-16 DIAGNOSIS — R92.8 ABNORMAL MAMMOGRAM: ICD-10-CM

## 2023-10-16 PROCEDURE — 76982 USE 1ST TARGET LESION: CPT

## 2023-10-16 PROCEDURE — 76981 USE PARENCHYMA: CPT | Mod: RIGHT SIDE | Performed by: RADIOLOGY

## 2023-10-16 PROCEDURE — 76642 ULTRASOUND BREAST LIMITED: CPT | Mod: RT

## 2023-10-16 PROCEDURE — 76642 ULTRASOUND BREAST LIMITED: CPT | Mod: RIGHT SIDE | Performed by: RADIOLOGY

## 2023-10-23 ENCOUNTER — APPOINTMENT (OUTPATIENT)
Dept: RADIOLOGY | Facility: HOSPITAL | Age: 43
End: 2023-10-23
Payer: COMMERCIAL

## 2023-11-02 ENCOUNTER — APPOINTMENT (OUTPATIENT)
Dept: RADIOLOGY | Facility: HOSPITAL | Age: 43
End: 2023-11-02
Payer: COMMERCIAL

## 2023-12-13 ENCOUNTER — OFFICE VISIT (OUTPATIENT)
Dept: RHEUMATOLOGY | Facility: CLINIC | Age: 43
End: 2023-12-13
Payer: COMMERCIAL

## 2023-12-13 VITALS — DIASTOLIC BLOOD PRESSURE: 80 MMHG | SYSTOLIC BLOOD PRESSURE: 122 MMHG | BODY MASS INDEX: 30.48 KG/M2 | WEIGHT: 199 LBS

## 2023-12-13 DIAGNOSIS — G89.29 OTHER CHRONIC PAIN: ICD-10-CM

## 2023-12-13 DIAGNOSIS — R76.8 ANA POSITIVE: Primary | ICD-10-CM

## 2023-12-13 DIAGNOSIS — M19.90 INFLAMMATORY ARTHRITIS: ICD-10-CM

## 2023-12-13 DIAGNOSIS — R53.83 OTHER FATIGUE: ICD-10-CM

## 2023-12-13 PROCEDURE — 99215 OFFICE O/P EST HI 40 MIN: CPT | Performed by: INTERNAL MEDICINE

## 2023-12-13 PROCEDURE — 1036F TOBACCO NON-USER: CPT | Performed by: INTERNAL MEDICINE

## 2023-12-13 NOTE — PROGRESS NOTES
"Recheck  OA  /  Inflammatory Arthritis  No change in arthritis.  New med meloxicam 15 mg per Dr. Bone.    New fracture right foot.    Bc    10 min late    HPI - she has a sesamoid fx foot, doesn't know how she did it - in a boot and seeing podiatry.  \"I am completely and utterly exhausted all the time like hard to function.\"  She has never had a sleep study.  States that her immunoglobulins are abnormal, and she got a pneumococcal vaccine, and now they are better.  Her immunologist put her on meloxicam saying that maybe she wasn't sleeping well because she had pain and didn't realize it.  She said that she is sometimes uncomfortable at night, but she hasn't noticed any difference on it.  She has a lot of LBP \"I have mild scolioisis, so it kind of always hurts\"  She also has pain in her broken foot.\"  Her back feels better temporarily after chiro.  She has never had PT.  She also c/o pain \"in the joint here\" - constant pain behid the angle of her jaw.  She hurts more than she did last time.  Her fingers always hurt.  No swelling.   AM stiffness up to 2 hrs.   No CP.  +SOB - uses [facility].  Gets \"coughing jags that turn into a bronchitis type thing but not really, that makes my chest all tight, and it hurts.\"  No GI    PE  NAD  RRR no r/m/g  CTA  No edema  No synovitis  R>L hand tenderness  Back tenderness    A/P - Pt with dx of inflam arthritis based on +MIGUEL and reeling better with hcq, but she continues to have back pain and hand pain but no evidence of inflammation.  Immunology gave her meloxicam, which is not making any difference  She has a lot of fatigue - sleep study  Reviewed CMP and CBC from primary  Pt is being w/u for immunodeficiency.  Pt states she had abn immunoglobulin levels, but I only see tests in Dec 2016, May and June 2023, and they are nl.  Pt showed me strep pneumo levels that were low, but they became positive after a pneumovax.  ?if she   She declines pain mgmt  She declines PT  She wants to " know if there is something else she can do like a diet change - I expl that a nutritious diet like the mediterranean diet is good for health but may not make a difference in her joint sx  Reeval 6 mo or sooner PRN    >40 min with pt and reviewing chart

## 2023-12-13 NOTE — ADDENDUM NOTE
Addended by: NASIR TOMLINSON on: 12/13/2023 10:17 AM     Modules accepted: Orders, Level of Service

## 2024-01-02 ENCOUNTER — OFFICE VISIT (OUTPATIENT)
Dept: PRIMARY CARE | Facility: CLINIC | Age: 44
End: 2024-01-02
Payer: COMMERCIAL

## 2024-01-02 VITALS
TEMPERATURE: 98.4 F | SYSTOLIC BLOOD PRESSURE: 124 MMHG | OXYGEN SATURATION: 95 % | DIASTOLIC BLOOD PRESSURE: 78 MMHG | HEART RATE: 68 BPM | WEIGHT: 199 LBS | BODY MASS INDEX: 29.47 KG/M2 | HEIGHT: 69 IN

## 2024-01-02 DIAGNOSIS — J02.9 PHARYNGITIS, UNSPECIFIED ETIOLOGY: Primary | ICD-10-CM

## 2024-01-02 LAB — POC RAPID STREP: NEGATIVE

## 2024-01-02 PROCEDURE — 99213 OFFICE O/P EST LOW 20 MIN: CPT | Performed by: FAMILY MEDICINE

## 2024-01-02 PROCEDURE — 1036F TOBACCO NON-USER: CPT | Performed by: FAMILY MEDICINE

## 2024-01-02 PROCEDURE — 87880 STREP A ASSAY W/OPTIC: CPT | Performed by: FAMILY MEDICINE

## 2024-01-02 RX ORDER — BUDESONIDE 90 UG/1
1 AEROSOL, POWDER RESPIRATORY (INHALATION) 2 TIMES DAILY
COMMUNITY
Start: 2023-09-25

## 2024-01-02 RX ORDER — AMOXICILLIN 875 MG/1
875 TABLET, FILM COATED ORAL 2 TIMES DAILY
Qty: 20 TABLET | Refills: 0 | Status: SHIPPED | OUTPATIENT
Start: 2024-01-02 | End: 2024-01-12

## 2024-01-02 ASSESSMENT — ENCOUNTER SYMPTOMS
COUGH: 0
FEVER: 0
SINUS PAIN: 1
CHILLS: 1

## 2024-01-02 NOTE — PROGRESS NOTES
"Subjective   Patient ID: Jose Wu is a 43 y.o. female who presents for Sore Throat and Headache. Sx's onset three days ago; recent exposure to strep.     Sore throat over the last 3 days  Headache, aches and pains    Daughter was recently hospitalized primarily with strep and dehydration      Tolerates amoxicillin has had in the past without hives         Review of Systems   Constitutional:  Positive for chills. Negative for fever.   HENT:  Positive for sinus pain.    Respiratory:  Negative for cough.        Objective   /78   Pulse 68   Temp 36.9 °C (98.4 °F)   Ht 1.74 m (5' 8.5\")   Wt 90.3 kg (199 lb)   SpO2 95%   BMI 29.82 kg/m²     Physical Exam  Constitutional:       Appearance: Normal appearance.   HENT:      Head: Normocephalic and atraumatic.      Right Ear: Tympanic membrane and ear canal normal.      Left Ear: Tympanic membrane and ear canal normal.      Nose: No nasal deformity.      Right Sinus: No maxillary sinus tenderness or frontal sinus tenderness.      Left Sinus: No maxillary sinus tenderness or frontal sinus tenderness.      Mouth/Throat:      Mouth: No oral lesions.      Tongue: No lesions.      Comments: Erythema of the tonsils, erythema and edema of the uvula  Cardiovascular:      Rate and Rhythm: Normal rate and regular rhythm.   Pulmonary:      Effort: Pulmonary effort is normal.      Breath sounds: Normal breath sounds.   Musculoskeletal:      Cervical back: No tenderness.   Lymphadenopathy:      Cervical: No cervical adenopathy.   Neurological:      Mental Status: She is alert.         Assessment/Plan   Problem List Items Addressed This Visit    None  Visit Diagnoses         Codes    Pharyngitis, unspecified etiology    -  Primary J02.9    Relevant Medications    amoxicillin (Amoxil) 875 mg tablet    Other Relevant Orders    POCT Rapid Strep A manually resulted (Completed)               "

## 2024-01-03 DIAGNOSIS — M19.90 INFLAMMATORY ARTHRITIS: Primary | ICD-10-CM

## 2024-01-03 RX ORDER — HYDROXYCHLOROQUINE SULFATE 200 MG/1
TABLET, FILM COATED ORAL 2 TIMES DAILY
Qty: 180 TABLET | Refills: 1 | Status: SHIPPED | OUTPATIENT
Start: 2024-01-03 | End: 2024-05-13 | Stop reason: SDUPTHER

## 2024-02-19 DIAGNOSIS — F32.A CHRONIC DEPRESSION: ICD-10-CM

## 2024-02-20 RX ORDER — BUPROPION HYDROCHLORIDE 300 MG/1
300 TABLET ORAL DAILY
Qty: 90 TABLET | Refills: 2 | Status: SHIPPED | OUTPATIENT
Start: 2024-02-20 | End: 2024-05-13

## 2024-02-26 ENCOUNTER — LAB (OUTPATIENT)
Dept: LAB | Facility: LAB | Age: 44
End: 2024-02-26
Payer: COMMERCIAL

## 2024-02-26 DIAGNOSIS — D80.1 HYPOGAMMAGLOBULINEMIA (MULTI): Primary | ICD-10-CM

## 2024-02-26 PROCEDURE — 36415 COLL VENOUS BLD VENIPUNCTURE: CPT

## 2024-02-26 PROCEDURE — 86317 IMMUNOASSAY INFECTIOUS AGENT: CPT

## 2024-03-01 LAB
S PN DA SERO 19F IGG SER-MCNC: 21.49 UG/ML
S PNEUM DA 1 IGG SER-MCNC: 9.73 UG/ML
S PNEUM DA 12F IGG SER-MCNC: 1.15 UG/ML
S PNEUM DA 14 IGG SER-MCNC: >35.84 UG/ML
S PNEUM DA 18C IGG SER-MCNC: 5.31 UG/ML
S PNEUM DA 23F IGG SER-MCNC: 4.64 UG/ML
S PNEUM DA 3 IGG SER-MCNC: 3.2 UG/ML
S PNEUM DA 4 IGG SER-MCNC: 8.16 UG/ML
S PNEUM DA 5 IGG SER-MCNC: 17.77 UG/ML
S PNEUM DA 6B IGG SER-MCNC: 3.72 UG/ML
S PNEUM DA 7F IGG SER-MCNC: 2.6 UG/ML
S PNEUM DA 8 IGG SER-MCNC: 2.44 UG/ML
S PNEUM DA 9N IGG SER-MCNC: 2.44 UG/ML
S PNEUM DA 9V IGG SER-MCNC: 4 UG/ML
S PNEUM SEROTYPE IGG SER-IMP: NORMAL

## 2024-04-28 DIAGNOSIS — K21.9 GASTROESOPHAGEAL REFLUX DISEASE, UNSPECIFIED WHETHER ESOPHAGITIS PRESENT: ICD-10-CM

## 2024-04-29 RX ORDER — OMEPRAZOLE 40 MG/1
40 CAPSULE, DELAYED RELEASE ORAL DAILY
Qty: 90 CAPSULE | Refills: 2 | Status: SHIPPED | OUTPATIENT
Start: 2024-04-29

## 2024-05-11 DIAGNOSIS — F32.A CHRONIC DEPRESSION: ICD-10-CM

## 2024-05-11 DIAGNOSIS — G43.009 MIGRAINE WITHOUT AURA AND WITHOUT STATUS MIGRAINOSUS, NOT INTRACTABLE: ICD-10-CM

## 2024-05-13 DIAGNOSIS — M19.90 INFLAMMATORY ARTHRITIS: ICD-10-CM

## 2024-05-13 RX ORDER — HYDROXYCHLOROQUINE SULFATE 200 MG/1
200 TABLET, FILM COATED ORAL 2 TIMES DAILY
Qty: 180 TABLET | Refills: 0 | Status: SHIPPED | OUTPATIENT
Start: 2024-05-13

## 2024-05-13 RX ORDER — SUMATRIPTAN 50 MG/1
50 TABLET, FILM COATED ORAL ONCE AS NEEDED
Qty: 10 TABLET | Refills: 2 | Status: SHIPPED | OUTPATIENT
Start: 2024-05-13

## 2024-05-13 RX ORDER — BUPROPION HYDROCHLORIDE 300 MG/1
300 TABLET ORAL DAILY
Qty: 90 TABLET | Refills: 1 | Status: SHIPPED | OUTPATIENT
Start: 2024-05-13

## 2024-06-07 ENCOUNTER — OFFICE VISIT (OUTPATIENT)
Dept: PRIMARY CARE | Facility: CLINIC | Age: 44
End: 2024-06-07
Payer: COMMERCIAL

## 2024-06-07 VITALS
TEMPERATURE: 98.6 F | DIASTOLIC BLOOD PRESSURE: 82 MMHG | WEIGHT: 199 LBS | BODY MASS INDEX: 29.47 KG/M2 | HEART RATE: 75 BPM | OXYGEN SATURATION: 97 % | SYSTOLIC BLOOD PRESSURE: 132 MMHG | HEIGHT: 69 IN

## 2024-06-07 DIAGNOSIS — R05.9 COUGH IN ADULT: Primary | ICD-10-CM

## 2024-06-07 DIAGNOSIS — J02.9 ACUTE SORE THROAT: ICD-10-CM

## 2024-06-07 DIAGNOSIS — J01.10 ACUTE NON-RECURRENT FRONTAL SINUSITIS: ICD-10-CM

## 2024-06-07 DIAGNOSIS — J02.9 SORE THROAT: ICD-10-CM

## 2024-06-07 LAB — POC RAPID STREP: NEGATIVE

## 2024-06-07 PROCEDURE — 99213 OFFICE O/P EST LOW 20 MIN: CPT | Performed by: NURSE PRACTITIONER

## 2024-06-07 PROCEDURE — 87081 CULTURE SCREEN ONLY: CPT

## 2024-06-07 PROCEDURE — 1036F TOBACCO NON-USER: CPT | Performed by: NURSE PRACTITIONER

## 2024-06-07 PROCEDURE — 87880 STREP A ASSAY W/OPTIC: CPT | Performed by: NURSE PRACTITIONER

## 2024-06-07 RX ORDER — METHYLPREDNISOLONE 4 MG/1
TABLET ORAL
Qty: 21 TABLET | Refills: 0 | Status: SHIPPED | OUTPATIENT
Start: 2024-06-07 | End: 2024-06-14

## 2024-06-07 RX ORDER — AMOXICILLIN AND CLAVULANATE POTASSIUM 875; 125 MG/1; MG/1
875 TABLET, FILM COATED ORAL 2 TIMES DAILY
Qty: 20 TABLET | Refills: 0 | Status: SHIPPED | OUTPATIENT
Start: 2024-06-07 | End: 2024-06-17

## 2024-06-07 ASSESSMENT — ENCOUNTER SYMPTOMS
FATIGUE: 1
SHORTNESS OF BREATH: 1
SORE THROAT: 1
FEVER: 0
DIFFICULTY URINATING: 0
COUGH: 1
NAUSEA: 0
MYALGIAS: 0
TROUBLE SWALLOWING: 0
CHILLS: 1
PALPITATIONS: 0
EYE PAIN: 0
JOINT SWELLING: 0
ABDOMINAL DISTENTION: 0
SINUS PAIN: 1
BRUISES/BLEEDS EASILY: 0
SINUS PRESSURE: 1
COLOR CHANGE: 0
DIARRHEA: 0
WEAKNESS: 0
WHEEZING: 1
HEADACHES: 0
WOUND: 0
SEIZURES: 0
DIZZINESS: 0
CONSTIPATION: 0
ADENOPATHY: 0
ABDOMINAL PAIN: 0

## 2024-06-07 NOTE — ASSESSMENT & PLAN NOTE
Due to length and severity of symptoms will initiate oral antibiotics.  Will also start steroid taper due to persistent upper respiratory concerns.  Patient also encouraged to continue to utilize Mucinex routinely for continued congestion/mucus.  She is notify office for any persistent or worsening infection/upper respiratory concerns.

## 2024-06-07 NOTE — PROGRESS NOTES
Subjective   Patient ID: Jose Wu is a 44 y.o. female who presents for Cough, Nasal Congestion, Sore Throat, Fatigue (About 2 weeks ago ), Headache, and Shortness of Breath (With movement ).    Patient seen today due to persistent sinus concerns for the past two weeks.  Patient seen sitting up in room, slightly uncomfortable appearing.  She is alert, oriented and is in fair spirits.  Patient is complaining of issues with sinus pressure, increased congestion, sore throat, harsh productive cough, intermittent wheezing and shortness of breath, headaches turning into migraines and low-grade fevers.  Patient states that mucus has been yellow/green; she has been using Mucinex, Flonase and her [place] pot with minimal success.  She has been doing her best to stay hydrated and has been using her Pulmicort inhaler per allergist instructions.  Patient states that she commonly gets sinus infections but works as a  and could have been to expose to other illnesses.  COVID test at home was negative.  She states she had RSV last year.  No chest pain or pressure.  Patient admits to difficulty sleeping due to persistent cough and overall symptoms.  Medications reviewed.  No other acute concerns voiced at this time.           Current Outpatient Medications on File Prior to Visit   Medication Sig Dispense Refill    acetaminophen (Tylenol) 325 mg tablet Take 500 mg by mouth in the morning and 500 mg at noon and 500 mg in the evening and 500 mg before bedtime.      albuterol 90 mcg/actuation inhaler Inhale 2 puffs every 4 hours if needed for shortness of breath or wheezing.      azelastine (Astelin) 137 mcg (0.1 %) nasal spray Administer 2 sprays into affected nostril(s) 2 times a day.      buPROPion XL (Wellbutrin XL) 300 mg 24 hr tablet Take 1 tablet (300 mg) by mouth once daily. 90 tablet 1    cetirizine (ZyrTEC) 10 mg tablet Take 1 tablet (10 mg) by mouth once daily.      fexofenadine (Allegra) 180 mg tablet  Take 1 tablet (180 mg) by mouth once daily.      flaxseed oiL 1,000 mg capsule Take 1 capsule (1,000 mg) by mouth once daily.      fluticasone (Flonase) 50 mcg/actuation nasal spray Administer 1 spray into affected nostril(s) once daily.      hydroxychloroquine (Plaquenil) 200 mg tablet Take 1 tablet (200 mg) by mouth 2 times a day. 180 tablet 0    ibuprofen 600 mg tablet Take 1 tablet (600 mg) by mouth every 6 hours if needed.      levonorgestrel (Mirena) 21 mcg/24 hours (8 yrs) 52 mg IUD 52 mg by intrauterine route.      meloxicam (Mobic) 15 mg tablet Take 1 tablet (15 mg) by mouth once daily.      omeprazole (PriLOSEC) 40 mg DR capsule Take 1 capsule (40 mg) by mouth once daily. 90 capsule 2    Pulmicort Flexhaler 90 mcg/actuation inhaler 1 puff 2 times a day.      saccharomyces boulardii (Florastor) 250 mg capsule Take 1 capsule (250 mg) by mouth once daily.      SUMAtriptan (Imitrex) 50 mg tablet Take 1 tablet (50 mg) by mouth 1 time if needed for migraine (repeat in 2 hours PRN) for up to 10 doses. 10 tablet 2    EPINEPHrine 0.3 mg/0.3 mL injection syringe Inject 0.3 mL (0.3 mg) as directed if needed for anaphylaxis for up to 2 days. As Directed 2 each 2     No current facility-administered medications on file prior to visit.       Past Medical History:   Diagnosis Date    Acute bacterial conjunctivitis of right eye 05/12/2023    Acute maxillary sinusitis, unspecified 07/23/2021    Acute maxillary sinusitis    Acute non-recurrent pansinusitis 05/12/2023    Cholecystitis, acute 05/12/2023    Cough 05/12/2023    COVID-19 05/12/2023    Personal history of other diseases of the musculoskeletal system and connective tissue     History of arthritis    Sore throat 05/12/2023    Tendonitis of foot 05/12/2023    UTI (urinary tract infection) 05/12/2023    UTI symptoms 09/15/2023    Yeast vaginitis 05/12/2023        Past Surgical History:   Procedure Laterality Date    TONSILLECTOMY  11/20/2017    Tonsillectomy     "    Family History   Problem Relation Name Age of Onset    Stroke Mother      Aneurysm Mother's Sister      Other (brain tumor) Mother's Sister          Review of Systems   Constitutional:  Positive for chills and fatigue. Negative for fever.   HENT:  Positive for congestion, sinus pressure, sinus pain and sore throat. Negative for dental problem and trouble swallowing.    Eyes:  Negative for pain and visual disturbance.   Respiratory:  Positive for cough, shortness of breath and wheezing.    Cardiovascular:  Negative for chest pain, palpitations and leg swelling.   Gastrointestinal:  Negative for abdominal distention, abdominal pain, constipation, diarrhea and nausea.   Endocrine: Negative for cold intolerance and heat intolerance.   Genitourinary:  Negative for difficulty urinating.   Musculoskeletal:  Negative for gait problem, joint swelling and myalgias.   Skin:  Negative for color change, pallor, rash and wound.   Allergic/Immunologic: Negative for environmental allergies and food allergies.   Neurological:  Negative for dizziness, seizures, weakness and headaches.   Hematological:  Negative for adenopathy. Does not bruise/bleed easily.   Psychiatric/Behavioral:  Negative for behavioral problems.    All other systems reviewed and are negative.      Objective   /82   Pulse 75   Temp 37 °C (98.6 °F)   Ht 1.74 m (5' 8.5\")   Wt 90.3 kg (199 lb)   SpO2 97%   BMI 29.82 kg/m²     Physical Exam  Constitutional:       General: She is not in acute distress.     Appearance: Normal appearance. She is not toxic-appearing.   HENT:      Head: Normocephalic and atraumatic.      Nose: Nose normal.      Mouth/Throat:      Mouth: Mucous membranes are moist.      Pharynx: Oropharynx is clear.   Eyes:      Extraocular Movements: Extraocular movements intact.      Conjunctiva/sclera: Conjunctivae normal.      Pupils: Pupils are equal, round, and reactive to light.   Cardiovascular:      Rate and Rhythm: Normal rate and " regular rhythm.      Pulses: Normal pulses.      Heart sounds: Normal heart sounds. No murmur heard.  Pulmonary:      Effort: Pulmonary effort is normal.      Breath sounds: Normal breath sounds. No wheezing.      Comments: Harsh cough; Diminished but clear throughout  Abdominal:      General: Bowel sounds are normal.      Palpations: Abdomen is soft.   Musculoskeletal:         General: No swelling. Normal range of motion.      Cervical back: Normal range of motion and neck supple.   Skin:     General: Skin is warm and dry.   Neurological:      General: No focal deficit present.      Mental Status: She is alert and oriented to person, place, and time. Mental status is at baseline.      Cranial Nerves: No cranial nerve deficit.      Motor: No weakness.   Psychiatric:         Mood and Affect: Mood normal.         Behavior: Behavior normal.         Thought Content: Thought content normal.         Judgment: Judgment normal.         Assessment/Plan   Problem List Items Addressed This Visit             ICD-10-CM    Cough in adult - Primary R05.9    Acute non-recurrent frontal sinusitis J01.10     Due to length and severity of symptoms will initiate oral antibiotics.  Will also start steroid taper due to persistent upper respiratory concerns.  Patient also encouraged to continue to utilize Mucinex routinely for continued congestion/mucus.  She is notify office for any persistent or worsening infection/upper respiratory concerns.         Relevant Medications    methylPREDNISolone (Medrol Dospak) 4 mg tablets    amoxicillin-pot clavulanate (Augmentin) 875-125 mg tablet    Acute sore throat J02.9     In office strep test negative.  Additional test sent out for culture.         Relevant Orders    POCT rapid strep A manually resulted    Group A Streptococcus, Culture     Other Visit Diagnoses         Codes    Sore throat     J02.9    Relevant Orders    Group A Streptococcus, Culture

## 2024-06-09 LAB — S PYO THROAT QL CULT: NORMAL

## 2024-06-12 ENCOUNTER — APPOINTMENT (OUTPATIENT)
Dept: RHEUMATOLOGY | Facility: CLINIC | Age: 44
End: 2024-06-12
Payer: COMMERCIAL

## 2024-07-05 DIAGNOSIS — G43.009 MIGRAINE WITHOUT AURA AND WITHOUT STATUS MIGRAINOSUS, NOT INTRACTABLE: ICD-10-CM

## 2024-07-05 RX ORDER — SUMATRIPTAN 50 MG/1
50 TABLET, FILM COATED ORAL ONCE AS NEEDED
Qty: 12 TABLET | Refills: 2 | Status: SHIPPED | OUTPATIENT
Start: 2024-07-05

## 2024-08-16 DIAGNOSIS — M19.90 INFLAMMATORY ARTHRITIS: ICD-10-CM

## 2024-08-19 RX ORDER — HYDROXYCHLOROQUINE SULFATE 200 MG/1
TABLET, FILM COATED ORAL 2 TIMES DAILY
Qty: 180 TABLET | Refills: 3 | OUTPATIENT
Start: 2024-08-19

## 2024-09-20 DIAGNOSIS — G43.009 MIGRAINE WITHOUT AURA AND WITHOUT STATUS MIGRAINOSUS, NOT INTRACTABLE: ICD-10-CM

## 2024-09-20 RX ORDER — SUMATRIPTAN 50 MG/1
TABLET, FILM COATED ORAL
Qty: 27 TABLET | Refills: 0 | Status: SHIPPED | OUTPATIENT
Start: 2024-09-20

## 2024-10-28 DIAGNOSIS — F32.A CHRONIC DEPRESSION: ICD-10-CM

## 2024-10-28 RX ORDER — BUPROPION HYDROCHLORIDE 300 MG/1
300 TABLET ORAL DAILY
Qty: 90 TABLET | Refills: 1 | Status: SHIPPED | OUTPATIENT
Start: 2024-10-28

## 2024-11-16 ENCOUNTER — HOSPITAL ENCOUNTER (OUTPATIENT)
Dept: RADIOLOGY | Facility: HOSPITAL | Age: 44
Discharge: HOME | End: 2024-11-16
Payer: COMMERCIAL

## 2024-11-16 VITALS — BODY MASS INDEX: 29.47 KG/M2 | HEIGHT: 69 IN | WEIGHT: 199 LBS

## 2024-11-16 DIAGNOSIS — Z12.31 SCREENING MAMMOGRAM FOR BREAST CANCER: ICD-10-CM

## 2024-11-16 PROCEDURE — 77067 SCR MAMMO BI INCL CAD: CPT

## 2024-11-16 PROCEDURE — 77063 BREAST TOMOSYNTHESIS BI: CPT | Performed by: RADIOLOGY

## 2024-11-16 PROCEDURE — 77067 SCR MAMMO BI INCL CAD: CPT | Performed by: RADIOLOGY

## 2025-01-01 DIAGNOSIS — K21.9 GASTROESOPHAGEAL REFLUX DISEASE, UNSPECIFIED WHETHER ESOPHAGITIS PRESENT: ICD-10-CM

## 2025-01-02 RX ORDER — OMEPRAZOLE 40 MG/1
40 CAPSULE, DELAYED RELEASE ORAL DAILY
Qty: 90 CAPSULE | Refills: 1 | Status: SHIPPED | OUTPATIENT
Start: 2025-01-02

## 2025-01-30 ENCOUNTER — OFFICE VISIT (OUTPATIENT)
Dept: PRIMARY CARE | Facility: CLINIC | Age: 45
End: 2025-01-30
Payer: COMMERCIAL

## 2025-01-30 VITALS
BODY MASS INDEX: 28.58 KG/M2 | TEMPERATURE: 98 F | DIASTOLIC BLOOD PRESSURE: 78 MMHG | SYSTOLIC BLOOD PRESSURE: 124 MMHG | HEART RATE: 93 BPM | OXYGEN SATURATION: 96 % | HEIGHT: 69 IN | WEIGHT: 193 LBS

## 2025-01-30 DIAGNOSIS — J01.00 ACUTE NON-RECURRENT MAXILLARY SINUSITIS: Primary | ICD-10-CM

## 2025-01-30 DIAGNOSIS — J45.20 MILD INTERMITTENT ASTHMA WITHOUT COMPLICATION (HHS-HCC): ICD-10-CM

## 2025-01-30 DIAGNOSIS — M04.1 PERIODIC FEVER (MULTI): ICD-10-CM

## 2025-01-30 PROBLEM — J40 BRONCHITIS: Status: RESOLVED | Noted: 2023-03-11 | Resolved: 2025-01-30

## 2025-01-30 PROBLEM — B37.9 ANTIBIOTIC-INDUCED YEAST INFECTION: Status: RESOLVED | Noted: 2023-03-11 | Resolved: 2025-01-30

## 2025-01-30 PROBLEM — R05.8 POST-VIRAL COUGH SYNDROME: Status: RESOLVED | Noted: 2023-09-15 | Resolved: 2025-01-30

## 2025-01-30 PROBLEM — R05.9 COUGH IN ADULT: Status: RESOLVED | Noted: 2024-06-07 | Resolved: 2025-01-30

## 2025-01-30 PROBLEM — K81.1 CHOLECYSTITIS, CHRONIC: Status: RESOLVED | Noted: 2023-05-12 | Resolved: 2025-01-30

## 2025-01-30 PROBLEM — R07.89 CHEST PAIN, ATYPICAL: Status: RESOLVED | Noted: 2023-05-12 | Resolved: 2025-01-30

## 2025-01-30 PROBLEM — T36.95XA ANTIBIOTIC-INDUCED YEAST INFECTION: Status: RESOLVED | Noted: 2023-03-11 | Resolved: 2025-01-30

## 2025-01-30 PROBLEM — J02.9 ACUTE SORE THROAT: Status: RESOLVED | Noted: 2024-06-07 | Resolved: 2025-01-30

## 2025-01-30 PROBLEM — R10.813 RIGHT LOWER QUADRANT ABDOMINAL TENDERNESS WITHOUT REBOUND TENDERNESS: Status: RESOLVED | Noted: 2023-05-12 | Resolved: 2025-01-30

## 2025-01-30 PROCEDURE — 3008F BODY MASS INDEX DOCD: CPT | Performed by: FAMILY MEDICINE

## 2025-01-30 PROCEDURE — 99214 OFFICE O/P EST MOD 30 MIN: CPT | Performed by: FAMILY MEDICINE

## 2025-01-30 PROCEDURE — 1036F TOBACCO NON-USER: CPT | Performed by: FAMILY MEDICINE

## 2025-01-30 RX ORDER — ALBUTEROL SULFATE 90 UG/1
2 INHALANT RESPIRATORY (INHALATION) EVERY 4 HOURS PRN
Qty: 18 G | Refills: 3 | Status: SHIPPED | OUTPATIENT
Start: 2025-01-30

## 2025-01-30 RX ORDER — BENZONATATE 200 MG/1
200 CAPSULE ORAL 3 TIMES DAILY PRN
Qty: 42 CAPSULE | Refills: 0 | Status: SHIPPED | OUTPATIENT
Start: 2025-01-30 | End: 2025-03-01

## 2025-01-30 RX ORDER — DOXYCYCLINE 100 MG/1
100 CAPSULE ORAL 2 TIMES DAILY
Qty: 14 CAPSULE | Refills: 0 | Status: SHIPPED | OUTPATIENT
Start: 2025-01-30 | End: 2025-02-06

## 2025-01-30 RX ORDER — METHYLPREDNISOLONE 4 MG/1
TABLET ORAL
Qty: 21 TABLET | Refills: 0 | Status: SHIPPED | OUTPATIENT
Start: 2025-01-30 | End: 2025-02-05

## 2025-01-30 ASSESSMENT — PATIENT HEALTH QUESTIONNAIRE - PHQ9
2. FEELING DOWN, DEPRESSED OR HOPELESS: NOT AT ALL
2. FEELING DOWN, DEPRESSED OR HOPELESS: NOT AT ALL
1. LITTLE INTEREST OR PLEASURE IN DOING THINGS: NOT AT ALL
SUM OF ALL RESPONSES TO PHQ9 QUESTIONS 1 AND 2: 0
1. LITTLE INTEREST OR PLEASURE IN DOING THINGS: NOT AT ALL
SUM OF ALL RESPONSES TO PHQ9 QUESTIONS 1 AND 2: 0

## 2025-01-30 ASSESSMENT — ENCOUNTER SYMPTOMS
FREQUENCY: 0
COUGH: 1
FATIGUE: 1
DYSURIA: 0

## 2025-01-30 NOTE — PATIENT INSTRUCTIONS
Acute sinusitis     Periodic right lower abdominal pain likely from adhesions   See if the steroids help, if persisting symptoms will consider further evaluation, CT scan    you may take over-the-counter medications as needed for symptom relief.  Call the office if your symptoms worsen such as high fever and worsening cough or increase in symptoms.     Follow up if symptoms worsen or persist.     Asthma : Continue your medications as prescribed.  If you find that you are needing your rescue inhaler more than twice a week then your asthma is not adequately controlled.  You should address this with your doctor, this most likely means that you are daily maintenance inhalers need to be increased.   With asthma you need to see your physician at least twice per year.

## 2025-01-30 NOTE — PROGRESS NOTES
"Subjective   Patient ID: Jose Wu is a 44 y.o. female who presents for covid positive home test on 25; pt reached out to her rheumatologist via my chart messaging and was prescribed Paxlovid; states she was advised to hold the Methotrexate. States she is feeling much worse now; cough, fatigue, sinus pressure HA and pain upon breathing in her upper chest and posterior portion of hr lungs. Pt had an asthma attack yesterday; tried using her  Pro Air but it did not really help much. Last sx pt has been experiencing is right portion abdominal pain which has been intermittent up until recently became more persistent.         Covid    Now with more sxs   Cough fatigue sinus pressure   Was on paxlovid     Asthma  attack yesterday   Chest congestion     Right sided abdominal pain has noticed this since she got her gallbladder and appendix removed  Intermittent   Has had gallbladder and appendix removed   Seems to be worse when she is ill    Seeing rheumatology, periodic fever syndrome, on methotrexate  Inflammatory arthritis         Review of Systems   Constitutional:  Positive for fatigue.   HENT:  Positive for congestion.    Respiratory:  Positive for cough.    Genitourinary:  Negative for dysuria and frequency.       Objective   /78   Pulse 93   Temp 36.7 °C (98 °F)   Ht 1.74 m (5' 8.5\")   Wt 87.5 kg (193 lb)   SpO2 96%   BMI 28.92 kg/m²     Physical Exam  Constitutional:       Appearance: Normal appearance.   HENT:      Head: Normocephalic and atraumatic.      Right Ear: Tympanic membrane and ear canal normal.      Left Ear: Tympanic membrane and ear canal normal.      Nose: No nasal deformity.      Right Sinus: Maxillary sinus tenderness and frontal sinus tenderness present.      Left Sinus: Maxillary sinus tenderness and frontal sinus tenderness present.      Mouth/Throat:      Mouth: Mucous membranes are moist. No oral lesions.      Tongue: No lesions.      Pharynx: Oropharynx is " clear.   Cardiovascular:      Rate and Rhythm: Normal rate and regular rhythm.   Pulmonary:      Effort: Pulmonary effort is normal.      Breath sounds: Normal breath sounds.   Abdominal:      General: Bowel sounds are normal.      Comments: No guarding or rebound  No CVAT  Mild tenderness to deep palpation along the right lateral abdomen   Musculoskeletal:      Cervical back: No tenderness.   Lymphadenopathy:      Cervical: No cervical adenopathy.   Neurological:      Mental Status: She is alert.         Assessment/Plan   Problem List Items Addressed This Visit    None  Visit Diagnoses         Codes    Acute non-recurrent maxillary sinusitis    -  Primary J01.00    Relevant Medications    doxycycline (Vibramycin) 100 mg capsule    methylPREDNISolone (Medrol Dospak) 4 mg tablets    benzonatate (Tessalon) 200 mg capsule    albuterol 90 mcg/actuation inhaler    Mild intermittent asthma without complication ([facility])     J45.20    Relevant Medications    albuterol 90 mcg/actuation inhaler

## 2025-03-18 ENCOUNTER — OFFICE VISIT (OUTPATIENT)
Dept: PRIMARY CARE | Facility: CLINIC | Age: 45
End: 2025-03-18
Payer: COMMERCIAL

## 2025-03-18 VITALS
HEART RATE: 70 BPM | TEMPERATURE: 98.2 F | BODY MASS INDEX: 30.17 KG/M2 | SYSTOLIC BLOOD PRESSURE: 128 MMHG | DIASTOLIC BLOOD PRESSURE: 81 MMHG | WEIGHT: 201.38 LBS | OXYGEN SATURATION: 98 %

## 2025-03-18 DIAGNOSIS — J01.00 ACUTE NON-RECURRENT MAXILLARY SINUSITIS: Primary | ICD-10-CM

## 2025-03-18 PROCEDURE — 1036F TOBACCO NON-USER: CPT | Performed by: FAMILY MEDICINE

## 2025-03-18 PROCEDURE — 99214 OFFICE O/P EST MOD 30 MIN: CPT | Performed by: FAMILY MEDICINE

## 2025-03-18 RX ORDER — PREDNISONE 10 MG/1
30 TABLET ORAL DAILY
Qty: 15 TABLET | Refills: 0 | Status: SHIPPED | OUTPATIENT
Start: 2025-03-18

## 2025-03-18 RX ORDER — AMOXICILLIN AND CLAVULANATE POTASSIUM 562.5; 437.5; 62.5 MG/1; MG/1; MG/1
2000 TABLET, FILM COATED, EXTENDED RELEASE ORAL 2 TIMES DAILY
Qty: 40 TABLET | Refills: 0 | Status: SHIPPED | OUTPATIENT
Start: 2025-03-18

## 2025-03-18 ASSESSMENT — ENCOUNTER SYMPTOMS
SINUS PRESSURE: 1
SINUS PAIN: 1

## 2025-03-18 NOTE — PROGRESS NOTES
"Subjective   Patient ID: Jose Wu is a 45 y.o. female who presents for Facial Pain (Pt presents c/o of severe L face pain, been on antibiotics for 5 days, not working/helping, saw dentist who informed sinus nothing to do with teeth, glands are swollen, throat hurts, hard to swallow entire face feels like its larger than it should be, L ear pain.).  HPI Historian(s): Self    Started about a week ago, sinuses had been \"funny\" for 1-2 weeks prior. All left-sided teeth started to hurt top and bottom. Saw dentist, reports nothing wrong with her teeth. Went to [facility], reports sinusitis with lot of fluid in the left ear. Started Augmentin, has completed about 5 days of therapy. Hurts very badly. Started to feel a little better since starting Augmentin. Pain seems to be migrating downward. Feels as if she has swelling. Denies fever, but feels feverish.    Review of Systems   HENT:  Positive for congestion, ear pain, sinus pressure and sinus pain.    All other systems reviewed and are negative.    No LMP recorded. Patient has had an implant.    Patient Care Team:  Marzena Lewis MD as PCP - General (Family Medicine)  DEV[facility]-C[facility]  Nicholas Bone, DO as Referring Physician (Allergy and Immunology)    Prior to Admission medications    Medication Sig Start Date End Date Taking? Authorizing Provider   acetaminophen (Tylenol) 325 mg tablet Take 500 mg by mouth in the morning and 500 mg at noon and 500 mg in the evening and 500 mg before bedtime. 7/28/20   Historical Provider, MD   albuterol 90 mcg/actuation inhaler Inhale 2 puffs every 4 hours if needed for shortness of breath or wheezing. 1/30/25   Marzena Lewis MD   azelastine (Astelin) 137 mcg (0.1 %) nasal spray Administer 2 sprays into affected nostril(s) 2 times a day. 7/7/20   Historical Provider, MD   buPROPion XL (Wellbutrin XL) 300 mg 24 hr tablet TAKE 1 TABLET DAILY 10/28/24   Marzena Lewis MD "   EPINEPHrine 0.3 mg/0.3 mL injection syringe Inject 0.3 mL (0.3 mg) as directed if needed for anaphylaxis for up to 2 days. As Directed 3/25/23 3/27/23  Jer Iverson MD   fexofenadine (Allegra) 180 mg tablet Take 1 tablet (180 mg) by mouth once daily.    Historical Provider, MD   flaxseed oiL 1,000 mg capsule Take 1 capsule (1,000 mg) by mouth once daily.    Historical Provider, MD   fluticasone (Flonase) 50 mcg/actuation nasal spray Administer 1 spray into affected nostril(s) once daily.    Historical Provider, MD   hydroxychloroquine (Plaquenil) 200 mg tablet Take 1 tablet (200 mg) by mouth 2 times a day. 5/13/24   Natalie García MD   ibuprofen 600 mg tablet Take 1 tablet (600 mg) by mouth every 6 hours if needed. 11/13/12   Historical Provider, MD   levonorgestrel (Mirena) 21 mcg/24 hours (8 yrs) 52 mg IUD 52 mg by intrauterine route. 12/5/16   Historical Provider, MD   meloxicam (Mobic) 15 mg tablet Take 1 tablet (15 mg) by mouth once daily. 8/31/23   Historical Provider, MD   omeprazole (PriLOSEC) 40 mg DR capsule TAKE 1 CAPSULE DAILY 1/2/25   Marzena Lewis MD   Pulmicort Flexhaler 90 mcg/actuation inhaler 1 puff 2 times a day. 9/25/23   Historical Provider, MD   saccharomyces boulardii (Florastor) 250 mg capsule Take 1 capsule (250 mg) by mouth once daily. 12/5/16   Historical Provider, MD   SUMAtriptan (Imitrex) 50 mg tablet TAKE 1 TABLET BY MOUTH 1 TIME IF NEEDED FOR MIGRAINE. MAY REPEAT AFTER 2 HOURS 9/20/24   DEVAN Martinez-CNP        Objective   /81   Pulse 70   Temp 36.8 °C (98.2 °F)   Wt 91.3 kg (201 lb 6 oz)   SpO2 98%   BMI 30.17 kg/m²           Physical Exam  Vitals and nursing note reviewed.   Constitutional:       General: She is not in acute distress.     Appearance: Normal appearance. She is not diaphoretic.      Comments: No assistive device presently being used.  Mask   HENT:      Head: Normocephalic and atraumatic.      Comments: mild swelling over left  maxillary sinus, tenderness of left maxillary sinus, left upper and lower jaw, left salivary glands with mild swelling     Right Ear: Tympanic membrane, ear canal and external ear normal. There is no impacted cerumen.      Left Ear: Tympanic membrane, ear canal and external ear normal. There is no impacted cerumen.      Nose: Congestion present.      Right Sinus: No maxillary sinus tenderness or frontal sinus tenderness.      Left Sinus: Maxillary sinus tenderness present. No frontal sinus tenderness.      Mouth/Throat:      Mouth: Mucous membranes are moist. No oral lesions.      Tongue: No lesions. Tongue does not deviate from midline.      Pharynx: Oropharynx is clear. Uvula midline. No oropharyngeal exudate, posterior oropharyngeal erythema or uvula swelling.      Tonsils: No tonsillar exudate or tonsillar abscesses. 0 on the right. 0 on the left.   Eyes:      General: No scleral icterus.     Extraocular Movements: Extraocular movements intact.      Conjunctiva/sclera: Conjunctivae normal.   Neck:      Trachea: Trachea and phonation normal. No tracheal deviation.   Cardiovascular:      Rate and Rhythm: Normal rate and regular rhythm.      Heart sounds: Normal heart sounds.   Pulmonary:      Effort: Pulmonary effort is normal. No respiratory distress.      Breath sounds: Normal breath sounds. No wheezing, rhonchi or rales.   Musculoskeletal:      Cervical back: Normal range of motion and neck supple. No rigidity or tenderness. Normal range of motion.   Lymphadenopathy:      Cervical: No cervical adenopathy.   Skin:     General: Skin is warm and dry.      Coloration: Skin is not jaundiced.   Neurological:      General: No focal deficit present.      Mental Status: She is alert and oriented to person, place, and time. Mental status is at baseline.   Psychiatric:         Mood and Affect: Mood normal.         Behavior: Behavior normal.         Thought Content: Thought content normal.         Assessment & Plan  Acute  non-recurrent maxillary sinusitis  High-dose Augmentin. DDx: shingles, trigeminal neuralgia. Try Prednisone burst for pain/inflammation. Return or seek medical attention if any change or worsening. May consider CT sinuses or ENT referral.  Orders:    amoxicillin-potassium clavulanate (Augmentin XR) 1,000-62.5 mg 12 hr tablet; Take 2 tablets (2,000 mg of amoxicillin) by mouth 2 times a day.    predniSONE (Deltasone) 10 mg tablet; Take 3 tablets (30 mg) by mouth once daily.

## 2025-03-18 NOTE — PATIENT INSTRUCTIONS
Please return or seek medical attention if symptoms persist, change, worsen, or return. For emergencies, call 9-1-1 or go to the nearest Emergency Room. Please schedule additional problem-focused appointment(s) to address additional problem(s).    Avoid taking Biotin (a vitamin, shows up particularly in hair/nail supplements) for a week prior to any blood tests, as it can interfere with certain results. Fasting for labs means 12 hours, nothing to eat or drink, except water and medications, unless directed otherwise.    For assistance with scheduling referrals or consultations, please call [phone]. For laboratory, radiology, and other tests, please call [phone] ([phone] for pediatrics). Please review prescription inserts and published information for possible adverse effects of all medications. Return after testing or consultation to review results and recommendations, if symptoms persist, change, worsen, or return, or if you have any question or concern. If you do not get results within 7-10 days, or you have any question or concern, please send a message, call the office ([phone]), or return to the office for a follow-up appointment. For non-emergencies, you may call the office. For emergencies, call 9-1-1 or go to the nearest Emergency Department. Please schedule additional appointment(s) to address concern(s) not addressed today. An annual Wellness visit is strongly recommended. A Wellness visit should be dedicated to addressing routine health maintenance matters (e.g., cancer screenings, cardiovascular screening, etc.). Problem-focused visits, typically prompted by symptoms or specific concerns, are usually conducted separately, particularly if multiple or complex problems need to be addressed.    In general, results are not released or discussed over the telephone, but at an appointment or via  [employer]. Results of tests done through [facility] are released via  [employer] (see  below).  [email or web address]  [facility] MyChart support line: [phone]

## 2025-04-28 DIAGNOSIS — F32.A CHRONIC DEPRESSION: ICD-10-CM

## 2025-04-28 RX ORDER — BUPROPION HYDROCHLORIDE 300 MG/1
300 TABLET ORAL DAILY
Qty: 90 TABLET | Refills: 3 | Status: SHIPPED | OUTPATIENT
Start: 2025-04-28

## 2025-06-03 PROBLEM — M19.90 ARTHRITIS: Status: ACTIVE | Noted: 2025-06-03

## 2025-06-03 PROBLEM — J01.10 ACUTE NON-RECURRENT FRONTAL SINUSITIS: Status: RESOLVED | Noted: 2024-06-07 | Resolved: 2025-06-03

## 2025-06-03 PROBLEM — H10.30 ACUTE INFECTIVE CONJUNCTIVITIS: Status: RESOLVED | Noted: 2025-06-03 | Resolved: 2025-06-03

## 2025-06-03 PROBLEM — M79.643 PAIN OF HAND: Status: ACTIVE | Noted: 2025-06-03

## 2025-06-03 RX ORDER — IBUPROFEN 800 MG/1
TABLET, FILM COATED ORAL
COMMUNITY
Start: 2025-03-14

## 2025-06-03 RX ORDER — LOTILANER OPHTHALMIC SOLUTION 2.5 MG/ML
SOLUTION/ DROPS OPHTHALMIC
COMMUNITY
Start: 2024-12-19

## 2025-06-03 RX ORDER — BUDESONIDE AND FORMOTEROL FUMARATE DIHYDRATE 80; 4.5 UG/1; UG/1
2 AEROSOL RESPIRATORY (INHALATION) 2 TIMES DAILY
COMMUNITY
Start: 2025-02-05

## 2025-06-03 RX ORDER — FOLIC ACID 1 MG/1
1 TABLET ORAL DAILY
COMMUNITY

## 2025-06-03 RX ORDER — GABAPENTIN 100 MG/1
CAPSULE ORAL
COMMUNITY
Start: 2025-05-29 | End: 2025-11-25

## 2025-06-03 RX ORDER — RIZATRIPTAN BENZOATE 10 MG/1
TABLET ORAL
COMMUNITY
Start: 2025-03-27

## 2025-06-03 RX ORDER — METHOTREXATE 2.5 MG/1
TABLET ORAL
COMMUNITY
Start: 2025-03-19

## 2025-06-09 ENCOUNTER — APPOINTMENT (OUTPATIENT)
Dept: PRIMARY CARE | Facility: CLINIC | Age: 45
End: 2025-06-09
Payer: COMMERCIAL

## 2025-06-09 VITALS
HEIGHT: 67 IN | BODY MASS INDEX: 30.54 KG/M2 | DIASTOLIC BLOOD PRESSURE: 78 MMHG | OXYGEN SATURATION: 98 % | WEIGHT: 194.6 LBS | SYSTOLIC BLOOD PRESSURE: 138 MMHG | HEART RATE: 67 BPM

## 2025-06-09 DIAGNOSIS — M04.1 PERIODIC FEVER (MULTI): ICD-10-CM

## 2025-06-09 DIAGNOSIS — M19.90 INFLAMMATORY ARTHRITIS: ICD-10-CM

## 2025-06-09 DIAGNOSIS — I69.30 SEQUELA, POST-STROKE: ICD-10-CM

## 2025-06-09 DIAGNOSIS — I69.398 IMBALANCE DUE TO OLD STROKE: ICD-10-CM

## 2025-06-09 DIAGNOSIS — F33.0 MILD EPISODE OF RECURRENT MAJOR DEPRESSIVE DISORDER: ICD-10-CM

## 2025-06-09 DIAGNOSIS — K21.9 GASTROESOPHAGEAL REFLUX DISEASE WITHOUT ESOPHAGITIS: ICD-10-CM

## 2025-06-09 DIAGNOSIS — G43.009 MIGRAINE WITHOUT AURA AND WITHOUT STATUS MIGRAINOSUS, NOT INTRACTABLE: ICD-10-CM

## 2025-06-09 DIAGNOSIS — J30.9 ALLERGIC RHINITIS, UNSPECIFIED SEASONALITY, UNSPECIFIED TRIGGER: ICD-10-CM

## 2025-06-09 DIAGNOSIS — Z13.220 LIPID SCREENING: ICD-10-CM

## 2025-06-09 DIAGNOSIS — Z13.6 ENCOUNTER FOR SCREENING FOR CARDIOVASCULAR DISORDERS: ICD-10-CM

## 2025-06-09 DIAGNOSIS — R26.89 IMBALANCE DUE TO OLD STROKE: ICD-10-CM

## 2025-06-09 DIAGNOSIS — Z00.00 ROUTINE GENERAL MEDICAL EXAMINATION AT A HEALTH CARE FACILITY: Primary | ICD-10-CM

## 2025-06-09 DIAGNOSIS — I69.90 PERSONAL HISTORY OF CEREBROVASCULAR ACCIDENT WITH RESIDUAL EFFECTS: ICD-10-CM

## 2025-06-09 PROBLEM — N63.10 BREAST MASS, RIGHT: Status: RESOLVED | Noted: 2023-05-12 | Resolved: 2025-06-09

## 2025-06-09 PROBLEM — M79.642 PAIN IN BOTH HANDS: Status: RESOLVED | Noted: 2023-05-12 | Resolved: 2025-06-09

## 2025-06-09 PROBLEM — R53.83 FATIGUE: Status: RESOLVED | Noted: 2023-05-12 | Resolved: 2025-06-09

## 2025-06-09 PROBLEM — M79.641 PAIN IN BOTH HANDS: Status: RESOLVED | Noted: 2023-05-12 | Resolved: 2025-06-09

## 2025-06-09 PROBLEM — M79.643 PAIN OF HAND: Status: RESOLVED | Noted: 2025-06-03 | Resolved: 2025-06-09

## 2025-06-09 PROBLEM — R10.13 INTERMITTENT EPIGASTRIC ABDOMINAL PAIN: Status: RESOLVED | Noted: 2023-05-12 | Resolved: 2025-06-09

## 2025-06-09 LAB
25(OH)D3+25(OH)D2 SERPL-MCNC: 27 NG/ML (ref 30–100)
CHOLEST SERPL-MCNC: 225 MG/DL
CHOLEST/HDLC SERPL: 3.6 (CALC)
HDLC SERPL-MCNC: 63 MG/DL
LDLC SERPL CALC-MCNC: 136 MG/DL (CALC)
NONHDLC SERPL-MCNC: 162 MG/DL (CALC)
TRIGL SERPL-MCNC: 133 MG/DL

## 2025-06-09 PROCEDURE — 3008F BODY MASS INDEX DOCD: CPT | Performed by: FAMILY MEDICINE

## 2025-06-09 PROCEDURE — 99214 OFFICE O/P EST MOD 30 MIN: CPT | Performed by: FAMILY MEDICINE

## 2025-06-09 PROCEDURE — 99396 PREV VISIT EST AGE 40-64: CPT | Performed by: FAMILY MEDICINE

## 2025-06-09 PROCEDURE — 1036F TOBACCO NON-USER: CPT | Performed by: FAMILY MEDICINE

## 2025-06-09 RX ORDER — RIMEGEPANT SULFATE 75 MG/75MG
TABLET, ORALLY DISINTEGRATING ORAL
COMMUNITY
Start: 2025-05-25

## 2025-06-09 ASSESSMENT — PATIENT HEALTH QUESTIONNAIRE - PHQ9
SUM OF ALL RESPONSES TO PHQ9 QUESTIONS 1 AND 2: 1
1. LITTLE INTEREST OR PLEASURE IN DOING THINGS: NOT AT ALL
2. FEELING DOWN, DEPRESSED OR HOPELESS: SEVERAL DAYS

## 2025-06-09 ASSESSMENT — ENCOUNTER SYMPTOMS
FATIGUE: 1
SHORTNESS OF BREATH: 0
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
## 2025-06-09 NOTE — PATIENT INSTRUCTIONS
Recommend echocardiogram  Recommend carotid ultrasound    Baby aspirin once a day    Discussed the potential of statin, given the evidence of old CVA would reduce your risk of recurrence  However, can cause some muscle aches      Asthma: Here for follow-up on asthma.  Patient is currently taking maintenance inhalers, occasional symptomatic inhalers.  Stable and current medications, is not having significant shortness of breath    Recommend take inhalers to prevent exacerbations    Allergy treatment :  You can take over-the-counter allergy medications.  There are all pretty similar: Claritin, Allegra, Zyrtec and Xyzal.  You can also try Benadryl however that is more likely to make you tired.  If the oral medication is not sufficient, you can add on an over-the-counter nasal spray like Flonase or Nasacort, his nasal sprays take a few days to notice improvement.  There are a couple of over-the-counter eyedrops that work well : Zaditor and Alaway.  If you continue to have symptoms and these medications are not helping, follow-up in the office as there are prescription medications we could add on if we need to.    Inflammatory arthritis , periodic fevers : continue with rheumatology    Lets see what your carotid and lipid looks like     Would suggest trial of statin after stable back on methotrexate

## 2025-06-11 NOTE — RESULT ENCOUNTER NOTE
Your vitamin D level is low.  I would recommend 1000 units per day of vitamin D. You will need a repeat vitamin D level in 3 months.    Mildly elevated cholesterol, recommend getting the other tests as ordered, lets see what they look like I still may suggest doing a statin based on your stroke history

## 2025-06-16 DIAGNOSIS — J45.20 MILD INTERMITTENT ASTHMA WITHOUT COMPLICATION (HHS-HCC): ICD-10-CM

## 2025-06-16 DIAGNOSIS — J01.00 ACUTE NON-RECURRENT MAXILLARY SINUSITIS: ICD-10-CM

## 2025-06-17 RX ORDER — ALBUTEROL SULFATE 90 UG/1
2 INHALANT RESPIRATORY (INHALATION) EVERY 4 HOURS PRN
Qty: 8 G | Refills: 0 | Status: SHIPPED | OUTPATIENT
Start: 2025-06-17

## 2025-06-18 DIAGNOSIS — I63.542: Primary | ICD-10-CM

## 2025-06-19 ENCOUNTER — HOSPITAL ENCOUNTER (OUTPATIENT)
Dept: VASCULAR MEDICINE | Facility: HOSPITAL | Age: 45
Discharge: HOME | End: 2025-06-19
Payer: COMMERCIAL

## 2025-06-19 DIAGNOSIS — I69.90 PERSONAL HISTORY OF CEREBROVASCULAR ACCIDENT WITH RESIDUAL EFFECTS: ICD-10-CM

## 2025-06-19 DIAGNOSIS — I69.30 SEQUELA, POST-STROKE: ICD-10-CM

## 2025-06-19 DIAGNOSIS — I69.398 IMBALANCE DUE TO OLD STROKE: ICD-10-CM

## 2025-06-19 DIAGNOSIS — R26.89 IMBALANCE DUE TO OLD STROKE: ICD-10-CM

## 2025-06-19 PROCEDURE — 93880 EXTRACRANIAL BILAT STUDY: CPT | Performed by: SURGERY

## 2025-06-19 PROCEDURE — 93880 EXTRACRANIAL BILAT STUDY: CPT

## 2025-06-20 LAB
B2 GLYCOPROT1 IGA SER-ACNC: NORMAL
B2 GLYCOPROT1 IGG SER-ACNC: NORMAL
B2 GLYCOPROT1 IGM SER-ACNC: NORMAL
CARDIOLIPIN IGA SER IA-ACNC: <2 APL-U/ML
CARDIOLIPIN IGG SER IA-ACNC: <2 GPL-U/ML
CARDIOLIPIN IGM SER IA-ACNC: <2 MPL-U/ML
LA 2 SCREEN W REFLEX-IMP: NORMAL
SCREEN APTT: NORMAL S
SCREEN DRVVT: NORMAL S

## 2025-06-24 LAB
B2 GLYCOPROT1 IGA SER-ACNC: <2 U/ML
B2 GLYCOPROT1 IGG SER-ACNC: <2 U/ML
B2 GLYCOPROT1 IGM SER-ACNC: <2 U/ML
CARDIOLIPIN IGA SER IA-ACNC: <2 APL-U/ML
CARDIOLIPIN IGG SER IA-ACNC: <2 GPL-U/ML
CARDIOLIPIN IGM SER IA-ACNC: <2 MPL-U/ML
CONFIRM APTT STACLOT: NEGATIVE
LA 2 SCREEN W REFLEX-IMP: ABNORMAL
SCREEN APTT: 41 SEC
SCREEN DRVVT: 31 SEC

## 2025-06-25 ENCOUNTER — HOSPITAL ENCOUNTER (OUTPATIENT)
Dept: CARDIOLOGY | Facility: CLINIC | Age: 45
Discharge: HOME | End: 2025-06-25
Payer: COMMERCIAL

## 2025-06-25 DIAGNOSIS — I69.90 PERSONAL HISTORY OF CEREBROVASCULAR ACCIDENT WITH RESIDUAL EFFECTS: ICD-10-CM

## 2025-06-25 DIAGNOSIS — I69.30 SEQUELA, POST-STROKE: ICD-10-CM

## 2025-06-25 DIAGNOSIS — I67.89 OTHER CEREBROVASCULAR DISEASE: ICD-10-CM

## 2025-06-25 DIAGNOSIS — I69.398 IMBALANCE DUE TO OLD STROKE: ICD-10-CM

## 2025-06-25 DIAGNOSIS — R26.89 IMBALANCE DUE TO OLD STROKE: ICD-10-CM

## 2025-06-25 LAB
AORTIC VALVE MEAN GRADIENT: 8 MMHG
AORTIC VALVE PEAK VELOCITY: 1.88 M/S
AV PEAK GRADIENT: 14 MMHG
AVA (PEAK VEL): 2.54 CM2
AVA (VTI): 2.31 CM2
EJECTION FRACTION APICAL 4 CHAMBER: 67.9
EJECTION FRACTION: 69 %
LEFT ATRIUM VOLUME AREA LENGTH INDEX BSA: 27.2 ML/M2
LEFT VENTRICLE INTERNAL DIMENSION DIASTOLE: 4.93 CM (ref 3.5–6)
LEFT VENTRICULAR OUTFLOW TRACT DIAMETER: 2.05 CM
MITRAL VALVE E/A RATIO: 1.18
MITRAL VALVE E/E' RATIO: 7.1
RIGHT VENTRICLE PEAK SYSTOLIC PRESSURE: 31 MMHG
TRICUSPID ANNULAR PLANE SYSTOLIC EXCURSION: 2.6 CM

## 2025-06-25 PROCEDURE — 93306 TTE W/DOPPLER COMPLETE: CPT | Performed by: STUDENT IN AN ORGANIZED HEALTH CARE EDUCATION/TRAINING PROGRAM

## 2025-06-25 PROCEDURE — 93306 TTE W/DOPPLER COMPLETE: CPT

## 2025-06-30 DIAGNOSIS — K21.9 GASTROESOPHAGEAL REFLUX DISEASE, UNSPECIFIED WHETHER ESOPHAGITIS PRESENT: ICD-10-CM

## 2025-06-30 RX ORDER — OMEPRAZOLE 40 MG/1
40 CAPSULE, DELAYED RELEASE ORAL DAILY
Qty: 90 CAPSULE | Refills: 3 | Status: SHIPPED | OUTPATIENT
Start: 2025-06-30

## 2025-08-04 RX ORDER — MUPIROCIN 20 MG/G
OINTMENT TOPICAL
COMMUNITY
Start: 2025-06-18

## 2025-08-04 RX ORDER — BUDESONIDE 1 MG/2ML
INHALANT ORAL
COMMUNITY
Start: 2025-06-18

## 2025-08-05 ENCOUNTER — APPOINTMENT (OUTPATIENT)
Dept: PRIMARY CARE | Facility: CLINIC | Age: 45
End: 2025-08-05
Payer: COMMERCIAL

## 2025-08-05 VITALS
WEIGHT: 198 LBS | SYSTOLIC BLOOD PRESSURE: 145 MMHG | OXYGEN SATURATION: 97 % | HEIGHT: 67 IN | DIASTOLIC BLOOD PRESSURE: 89 MMHG | BODY MASS INDEX: 31.08 KG/M2 | TEMPERATURE: 98.1 F | HEART RATE: 73 BPM

## 2025-08-05 DIAGNOSIS — F41.9 ANXIETY: Primary | ICD-10-CM

## 2025-08-05 DIAGNOSIS — F32.A CHRONIC DEPRESSION: ICD-10-CM

## 2025-08-05 DIAGNOSIS — G43.009 MIGRAINE WITHOUT AURA AND WITHOUT STATUS MIGRAINOSUS, NOT INTRACTABLE: ICD-10-CM

## 2025-08-05 PROCEDURE — 1036F TOBACCO NON-USER: CPT | Performed by: FAMILY MEDICINE

## 2025-08-05 PROCEDURE — 3008F BODY MASS INDEX DOCD: CPT | Performed by: FAMILY MEDICINE

## 2025-08-05 PROCEDURE — 99214 OFFICE O/P EST MOD 30 MIN: CPT | Performed by: FAMILY MEDICINE

## 2025-08-05 RX ORDER — ESCITALOPRAM OXALATE 10 MG/1
10 TABLET ORAL DAILY
Qty: 90 TABLET | Refills: 0 | Status: SHIPPED | OUTPATIENT
Start: 2025-08-05 | End: 2025-11-03

## 2025-08-05 ASSESSMENT — PATIENT HEALTH QUESTIONNAIRE - PHQ9
2. FEELING DOWN, DEPRESSED OR HOPELESS: NOT AT ALL
1. LITTLE INTEREST OR PLEASURE IN DOING THINGS: NOT AT ALL
SUM OF ALL RESPONSES TO PHQ9 QUESTIONS 1 AND 2: 0

## 2025-08-05 ASSESSMENT — ENCOUNTER SYMPTOMS
SHORTNESS OF BREATH: 0
FEVER: 0
DIARRHEA: 0
FATIGUE: 1
DIZZINESS: 1
CHEST TIGHTNESS: 1
PALPITATIONS: 1
LIGHT-HEADEDNESS: 1
CONSTIPATION: 0

## 2025-08-05 ASSESSMENT — ANXIETY QUESTIONNAIRES
6. BECOMING EASILY ANNOYED OR IRRITABLE: MORE THAN HALF THE DAYS
5. BEING SO RESTLESS THAT IT IS HARD TO SIT STILL: MORE THAN HALF THE DAYS
2. NOT BEING ABLE TO STOP OR CONTROL WORRYING: SEVERAL DAYS
IF YOU CHECKED OFF ANY PROBLEMS ON THIS QUESTIONNAIRE, HOW DIFFICULT HAVE THESE PROBLEMS MADE IT FOR YOU TO DO YOUR WORK, TAKE CARE OF THINGS AT HOME, OR GET ALONG WITH OTHER PEOPLE: SOMEWHAT DIFFICULT
1. FEELING NERVOUS, ANXIOUS, OR ON EDGE: NEARLY EVERY DAY
3. WORRYING TOO MUCH ABOUT DIFFERENT THINGS: SEVERAL DAYS
GAD7 TOTAL SCORE: 12
4. TROUBLE RELAXING: MORE THAN HALF THE DAYS
7. FEELING AFRAID AS IF SOMETHING AWFUL MIGHT HAPPEN: SEVERAL DAYS

## 2025-08-05 NOTE — PATIENT INSTRUCTIONS
Anxiety :  For your anxiety is important that you do activities that contribute to relaxation.  Regular exercise and adequate sleep are very important.  Counseling can be beneficial as well.  If you are  on medications for anxiety is important that you take them as directed and let your physician know if you continue to have symptoms or if your symptoms worsen.  It is also important that you be seen in the office on a regular basis at least every 6 months.       Behavioral health care:  Discussed with patient the option of doing some behavioral health counseling.  Our behavioral health specialist is in our office, she does counseling for common behavioral health issues such as anxiety, depression, grief.  This is a collaborative care agreements, she works with us to treat you for depression and anxiety.  She excepts the same insurance companies that we as providers do.  You may have co-pays or payments depending upon your insurance mental health coverage.  I will place a referral for her and she should be in contact with you in the next 1 to 2 weeks.  If you have more complex issues where it might be necessary to get the opinion of a psychiatrist she can then refer you to them as well.    Lets add SSRI       Follow up next week for nurse visit Qtc  Due to hydroxychloroquine     Previous EKG normal Qtc

## 2025-08-05 NOTE — PROGRESS NOTES
"Subjective   Patient ID: Jose Wu is a 45 y.o. female who presents for Anxiety and panic attacks. States the Wellbutrin has not been enough. Dealing with family stress. See [facility]; would like to see Valentina.     More anxiety lately   Some panic attacks   Remote PRN meds       Depression on wellbutrin   Interested in counseling     Anxiety chest tightness   Trouble sleeping   Low energy level       Migraine : seeing neurology   Was under impression that she had a CVA,  eval was all normal  echo normal   Does have migraines   Neuroloy notes reviewed     On hydroxychloroquine for periodic fever syndrome                Started getting worse around April due to family issues and concern about medical issue.     Panic attacks used to get them occasionally. Now getting them more. Now also increased generalized anxiety and crabbiness.    Some sx of depression/sadness but more anxiety feelings. No thoughts of suicide. Has seen a counselor/therapist in the past but had a bad experience.    Wellbutrin was working OK throughout the Winter. Has been on it on and off for 7-8 years. Was previously on more acute anxiolytic medication but otherwise no other antidepressants/anti anxiety medications.    Review of Systems   Constitutional:  Positive for fatigue. Negative for fever.   Respiratory:  Positive for chest tightness. Negative for shortness of breath.    Cardiovascular:  Positive for palpitations. Negative for chest pain.   Gastrointestinal:  Negative for constipation and diarrhea.   Neurological:  Positive for dizziness and light-headedness.   Psychiatric/Behavioral:  Negative for suicidal ideas.      Objective   /89   Pulse 73   Temp 36.7 °C (98.1 °F)   Ht 1.702 m (5' 7\")   Wt 89.8 kg (198 lb)   SpO2 97%   BMI 31.01 kg/m²     Physical Exam  Constitutional:       Appearance: Normal appearance. She is well-developed.     Cardiovascular:      Rate and Rhythm: Normal rate and regular rhythm.      Heart sounds: " Normal heart sounds. No murmur heard.  Pulmonary:      Effort: Pulmonary effort is normal.      Breath sounds: Normal breath sounds.     Neurological:      General: No focal deficit present.      Mental Status: She is alert.     Psychiatric:         Mood and Affect: Mood normal.         Behavior: Behavior normal.      Comments: A little pressured          Assessment/Plan   Problem List Items Addressed This Visit           ICD-10-CM    Chronic depression F32.A    Relevant Medications    escitalopram (Lexapro) 10 mg tablet    Migraine without aura and without status migrainosus, not intractable G43.009     Other Visit Diagnoses         Codes      Anxiety    -  Primary F41.9    Relevant Medications    escitalopram (Lexapro) 10 mg tablet    Other Relevant Orders    Follow Up In [facility]

## 2025-08-12 ENCOUNTER — APPOINTMENT (OUTPATIENT)
Dept: PRIMARY CARE | Facility: CLINIC | Age: 45
End: 2025-08-12
Payer: COMMERCIAL

## 2025-08-12 DIAGNOSIS — M19.90 INFLAMMATORY ARTHRITIS: ICD-10-CM

## 2025-08-12 ASSESSMENT — PATIENT HEALTH QUESTIONNAIRE - PHQ9
SUM OF ALL RESPONSES TO PHQ9 QUESTIONS 1 AND 2: 0
1. LITTLE INTEREST OR PLEASURE IN DOING THINGS: NOT AT ALL
2. FEELING DOWN, DEPRESSED OR HOPELESS: NOT AT ALL

## 2025-08-13 ENCOUNTER — HOSPITAL ENCOUNTER (OUTPATIENT)
Dept: RADIOLOGY | Facility: HOSPITAL | Age: 45
Discharge: HOME | End: 2025-08-13
Payer: COMMERCIAL

## 2025-08-13 DIAGNOSIS — Z13.6 ENCOUNTER FOR SCREENING FOR CARDIOVASCULAR DISORDERS: ICD-10-CM

## 2025-08-13 PROCEDURE — 75571 CT HRT W/O DYE W/CA TEST: CPT

## 2025-08-17 DIAGNOSIS — F41.9 ANXIETY: ICD-10-CM

## 2025-08-18 RX ORDER — ESCITALOPRAM OXALATE 10 MG/1
10 TABLET ORAL DAILY
Qty: 100 TABLET | Refills: 1 | Status: SHIPPED | OUTPATIENT
Start: 2025-08-18

## 2025-08-25 ENCOUNTER — APPOINTMENT (OUTPATIENT)
Dept: PRIMARY CARE | Facility: CLINIC | Age: 45
End: 2025-08-25
Payer: COMMERCIAL

## 2025-08-27 ASSESSMENT — PATIENT HEALTH QUESTIONNAIRE - PHQ9
3. TROUBLE FALLING OR STAYING ASLEEP: SEVERAL DAYS
SUM OF ALL RESPONSES TO PHQ9 QUESTIONS 1 & 2: 1
7. TROUBLE CONCENTRATING ON THINGS, SUCH AS READING THE NEWSPAPER OR WATCHING TELEVISION: MORE THAN HALF THE DAYS
5. POOR APPETITE OR OVEREATING: NOT AT ALL
8. MOVING OR SPEAKING SO SLOWLY THAT OTHER PEOPLE COULD HAVE NOTICED. OR THE OPPOSITE, BEING SO FIGETY OR RESTLESS THAT YOU HAVE BEEN MOVING AROUND A LOT MORE THAN USUAL: MORE THAN HALF THE DAYS
9. THOUGHTS THAT YOU WOULD BE BETTER OFF DEAD, OR OF HURTING YOURSELF: NOT AT ALL
6. FEELING BAD ABOUT YOURSELF - OR THAT YOU ARE A FAILURE OR HAVE LET YOURSELF OR YOUR FAMILY DOWN: NOT AT ALL
4. FEELING TIRED OR HAVING LITTLE ENERGY: NEARLY EVERY DAY
SUM OF ALL RESPONSES TO PHQ QUESTIONS 1-9: 9
1. LITTLE INTEREST OR PLEASURE IN DOING THINGS: NOT AT ALL
2. FEELING DOWN, DEPRESSED OR HOPELESS: SEVERAL DAYS

## 2025-08-27 ASSESSMENT — ANXIETY QUESTIONNAIRES
7. FEELING AFRAID AS IF SOMETHING AWFUL MIGHT HAPPEN: SEVERAL DAYS
5. BEING SO RESTLESS THAT IT IS HARD TO SIT STILL: SEVERAL DAYS
3. WORRYING TOO MUCH ABOUT DIFFERENT THINGS: SEVERAL DAYS
6. BECOMING EASILY ANNOYED OR IRRITABLE: SEVERAL DAYS
1. FEELING NERVOUS, ANXIOUS, OR ON EDGE: MORE THAN HALF THE DAYS
4. TROUBLE RELAXING: SEVERAL DAYS
GAD7 TOTAL SCORE: 8
IF YOU CHECKED OFF ANY PROBLEMS ON THIS QUESTIONNAIRE, HOW DIFFICULT HAVE THESE PROBLEMS MADE IT FOR YOU TO DO YOUR WORK, TAKE CARE OF THINGS AT HOME, OR GET ALONG WITH OTHER PEOPLE: SOMEWHAT DIFFICULT
2. NOT BEING ABLE TO STOP OR CONTROL WORRYING: SEVERAL DAYS

## 2025-09-02 ENCOUNTER — DOCUMENTATION WITH CHARGES (OUTPATIENT)
Dept: PRIMARY CARE | Facility: CLINIC | Age: 45
End: 2025-09-02
Payer: COMMERCIAL

## 2025-09-02 DIAGNOSIS — F41.9 ANXIETY: Primary | ICD-10-CM

## 2025-09-11 ENCOUNTER — APPOINTMENT (OUTPATIENT)
Dept: PRIMARY CARE | Facility: CLINIC | Age: 45
End: 2025-09-11
Payer: COMMERCIAL

## 2025-09-22 ENCOUNTER — APPOINTMENT (OUTPATIENT)
Dept: PRIMARY CARE | Facility: CLINIC | Age: 45
End: 2025-09-22
Payer: COMMERCIAL